# Patient Record
Sex: FEMALE | Race: WHITE | NOT HISPANIC OR LATINO | Employment: OTHER | URBAN - METROPOLITAN AREA
[De-identification: names, ages, dates, MRNs, and addresses within clinical notes are randomized per-mention and may not be internally consistent; named-entity substitution may affect disease eponyms.]

---

## 2017-07-24 ENCOUNTER — HOSPITAL ENCOUNTER (OUTPATIENT)
Facility: HOSPITAL | Age: 82
Discharge: NURSING, REHAB OR LONG-TERM CARE | End: 2017-07-26
Attending: EMERGENCY MEDICINE | Admitting: HOSPITALIST
Payer: MEDICARE

## 2017-07-24 DIAGNOSIS — I50.32 HEART FAILURE, DIASTOLIC, CHRONIC: ICD-10-CM

## 2017-07-24 DIAGNOSIS — G30.1 LATE ONSET ALZHEIMER'S DISEASE WITHOUT BEHAVIORAL DISTURBANCE: ICD-10-CM

## 2017-07-24 DIAGNOSIS — F02.80 LATE ONSET ALZHEIMER'S DISEASE WITHOUT BEHAVIORAL DISTURBANCE: ICD-10-CM

## 2017-07-24 DIAGNOSIS — R10.9 ABDOMINAL PAIN, UNSPECIFIED LOCATION: Primary | ICD-10-CM

## 2017-07-24 DIAGNOSIS — N18.30 CHRONIC KIDNEY DISEASE, STAGE III (MODERATE): ICD-10-CM

## 2017-07-24 LAB
ALBUMIN SERPL BCP-MCNC: 2.3 G/DL
ALP SERPL-CCNC: 95 U/L
ALT SERPL W/O P-5'-P-CCNC: 13 U/L
ANION GAP SERPL CALC-SCNC: 7 MMOL/L
AST SERPL-CCNC: 34 U/L
BASOPHILS # BLD AUTO: 0.04 K/UL
BASOPHILS NFR BLD: 0.8 %
BILIRUB SERPL-MCNC: 0.6 MG/DL
BUN SERPL-MCNC: 15 MG/DL
BUN SERPL-MCNC: 19 MG/DL (ref 6–30)
CALCIUM SERPL-MCNC: 8.4 MG/DL
CHLORIDE SERPL-SCNC: 112 MMOL/L
CHLORIDE SERPL-SCNC: 112 MMOL/L (ref 95–110)
CO2 SERPL-SCNC: 21 MMOL/L
CREAT SERPL-MCNC: 1 MG/DL (ref 0.5–1.4)
CREAT SERPL-MCNC: 1.1 MG/DL
DIFFERENTIAL METHOD: ABNORMAL
EOSINOPHIL # BLD AUTO: 0.1 K/UL
EOSINOPHIL NFR BLD: 1.9 %
ERYTHROCYTE [DISTWIDTH] IN BLOOD BY AUTOMATED COUNT: 13.8 %
EST. GFR  (AFRICAN AMERICAN): 52.5 ML/MIN/1.73 M^2
EST. GFR  (NON AFRICAN AMERICAN): 45.6 ML/MIN/1.73 M^2
GLUCOSE SERPL-MCNC: 101 MG/DL (ref 70–110)
GLUCOSE SERPL-MCNC: 102 MG/DL
HCT VFR BLD AUTO: 34.3 %
HCT VFR BLD CALC: 32 %PCV (ref 36–54)
HGB BLD-MCNC: 11.4 G/DL
LYMPHOCYTES # BLD AUTO: 1.2 K/UL
LYMPHOCYTES NFR BLD: 24 %
MCH RBC QN AUTO: 31.4 PG
MCHC RBC AUTO-ENTMCNC: 33.2 G/DL
MCV RBC AUTO: 95 FL
MONOCYTES # BLD AUTO: 0.6 K/UL
MONOCYTES NFR BLD: 12.3 %
NEUTROPHILS # BLD AUTO: 2.9 K/UL
NEUTROPHILS NFR BLD: 60.6 %
PLATELET # BLD AUTO: 104 K/UL
PMV BLD AUTO: 10.1 FL
POC IONIZED CALCIUM: 1.13 MMOL/L (ref 1.06–1.42)
POC TCO2 (MEASURED): 26 MMOL/L (ref 23–29)
POTASSIUM BLD-SCNC: 4.2 MMOL/L (ref 3.5–5.1)
POTASSIUM SERPL-SCNC: 4.2 MMOL/L
PROT SERPL-MCNC: 6.6 G/DL
RBC # BLD AUTO: 3.63 M/UL
SAMPLE: ABNORMAL
SODIUM BLD-SCNC: 144 MMOL/L (ref 136–145)
SODIUM SERPL-SCNC: 140 MMOL/L
WBC # BLD AUTO: 4.8 K/UL

## 2017-07-24 PROCEDURE — 80053 COMPREHEN METABOLIC PANEL: CPT

## 2017-07-24 PROCEDURE — 99285 EMERGENCY DEPT VISIT HI MDM: CPT | Mod: ,,, | Performed by: EMERGENCY MEDICINE

## 2017-07-24 PROCEDURE — 96360 HYDRATION IV INFUSION INIT: CPT | Mod: 59

## 2017-07-24 PROCEDURE — 85025 COMPLETE CBC W/AUTO DIFF WBC: CPT

## 2017-07-24 PROCEDURE — 99285 EMERGENCY DEPT VISIT HI MDM: CPT | Mod: 25

## 2017-07-25 PROBLEM — K44.9 HIATAL HERNIA: Status: ACTIVE | Noted: 2017-07-25

## 2017-07-25 PROBLEM — R10.9 ABDOMINAL PAIN: Status: ACTIVE | Noted: 2017-07-25

## 2017-07-25 LAB
BILIRUB UR QL STRIP: NEGATIVE
CLARITY UR REFRACT.AUTO: ABNORMAL
COLOR UR AUTO: YELLOW
GLUCOSE UR QL STRIP: NEGATIVE
HGB UR QL STRIP: NEGATIVE
KETONES UR QL STRIP: NEGATIVE
LEUKOCYTE ESTERASE UR QL STRIP: NEGATIVE
NITRITE UR QL STRIP: NEGATIVE
PH UR STRIP: 8 [PH] (ref 5–8)
PROT UR QL STRIP: NEGATIVE
SP GR UR STRIP: 1.01 (ref 1–1.03)
URN SPEC COLLECT METH UR: ABNORMAL
UROBILINOGEN UR STRIP-ACNC: NEGATIVE EU/DL

## 2017-07-25 PROCEDURE — 99220 PR INITIAL OBSERVATION CARE,LEVL III: CPT | Mod: ,,, | Performed by: PHYSICIAN ASSISTANT

## 2017-07-25 PROCEDURE — G8996 SWALLOW CURRENT STATUS: HCPCS | Mod: CK

## 2017-07-25 PROCEDURE — A4216 STERILE WATER/SALINE, 10 ML: HCPCS | Performed by: PHYSICIAN ASSISTANT

## 2017-07-25 PROCEDURE — G0378 HOSPITAL OBSERVATION PER HR: HCPCS

## 2017-07-25 PROCEDURE — 25000003 PHARM REV CODE 250: Performed by: PHYSICIAN ASSISTANT

## 2017-07-25 PROCEDURE — G8997 SWALLOW GOAL STATUS: HCPCS | Mod: CJ

## 2017-07-25 PROCEDURE — 25500020 PHARM REV CODE 255: Performed by: EMERGENCY MEDICINE

## 2017-07-25 PROCEDURE — 81003 URINALYSIS AUTO W/O SCOPE: CPT

## 2017-07-25 PROCEDURE — 92610 EVALUATE SWALLOWING FUNCTION: CPT

## 2017-07-25 RX ORDER — PANTOPRAZOLE SODIUM 40 MG/1
40 TABLET, DELAYED RELEASE ORAL DAILY
Status: DISCONTINUED | OUTPATIENT
Start: 2017-07-25 | End: 2017-07-26 | Stop reason: HOSPADM

## 2017-07-25 RX ORDER — DONEPEZIL HYDROCHLORIDE 10 MG/1
10 TABLET, FILM COATED ORAL DAILY
Status: DISCONTINUED | OUTPATIENT
Start: 2017-07-25 | End: 2017-07-26 | Stop reason: HOSPADM

## 2017-07-25 RX ORDER — ESCITALOPRAM OXALATE 10 MG/1
10 TABLET ORAL DAILY
Status: DISCONTINUED | OUTPATIENT
Start: 2017-07-25 | End: 2017-07-26 | Stop reason: HOSPADM

## 2017-07-25 RX ORDER — AMOXICILLIN 250 MG
1 CAPSULE ORAL 2 TIMES DAILY
Status: DISCONTINUED | OUTPATIENT
Start: 2017-07-25 | End: 2017-07-26 | Stop reason: HOSPADM

## 2017-07-25 RX ORDER — ONDANSETRON 4 MG/1
8 TABLET, ORALLY DISINTEGRATING ORAL EVERY 8 HOURS PRN
Status: DISCONTINUED | OUTPATIENT
Start: 2017-07-25 | End: 2017-07-26 | Stop reason: HOSPADM

## 2017-07-25 RX ORDER — ONDANSETRON 2 MG/ML
4 INJECTION INTRAMUSCULAR; INTRAVENOUS EVERY 8 HOURS PRN
Status: DISCONTINUED | OUTPATIENT
Start: 2017-07-25 | End: 2017-07-26 | Stop reason: HOSPADM

## 2017-07-25 RX ORDER — LISINOPRIL 5 MG/1
5 TABLET ORAL DAILY
Status: DISCONTINUED | OUTPATIENT
Start: 2017-07-25 | End: 2017-07-26 | Stop reason: HOSPADM

## 2017-07-25 RX ORDER — MEMANTINE HYDROCHLORIDE 10 MG/1
10 TABLET ORAL 2 TIMES DAILY
Status: DISCONTINUED | OUTPATIENT
Start: 2017-07-25 | End: 2017-07-26 | Stop reason: HOSPADM

## 2017-07-25 RX ORDER — ASPIRIN 81 MG/1
81 TABLET ORAL DAILY
Status: DISCONTINUED | OUTPATIENT
Start: 2017-07-25 | End: 2017-07-26 | Stop reason: HOSPADM

## 2017-07-25 RX ORDER — GUAIFENESIN 600 MG/1
1200 TABLET, EXTENDED RELEASE ORAL 2 TIMES DAILY
Status: DISCONTINUED | OUTPATIENT
Start: 2017-07-25 | End: 2017-07-26 | Stop reason: HOSPADM

## 2017-07-25 RX ORDER — RAMELTEON 8 MG/1
8 TABLET ORAL NIGHTLY PRN
Status: DISCONTINUED | OUTPATIENT
Start: 2017-07-25 | End: 2017-07-26 | Stop reason: HOSPADM

## 2017-07-25 RX ORDER — LEVALBUTEROL INHALATION SOLUTION 0.63 MG/3ML
1 SOLUTION RESPIRATORY (INHALATION) EVERY 4 HOURS PRN
Status: DISCONTINUED | OUTPATIENT
Start: 2017-07-25 | End: 2017-07-26 | Stop reason: HOSPADM

## 2017-07-25 RX ORDER — SODIUM CHLORIDE 0.9 % (FLUSH) 0.9 %
3 SYRINGE (ML) INJECTION EVERY 8 HOURS
Status: DISCONTINUED | OUTPATIENT
Start: 2017-07-25 | End: 2017-07-26 | Stop reason: HOSPADM

## 2017-07-25 RX ORDER — AMLODIPINE BESYLATE 5 MG/1
5 TABLET ORAL DAILY
Status: DISCONTINUED | OUTPATIENT
Start: 2017-07-25 | End: 2017-07-26 | Stop reason: HOSPADM

## 2017-07-25 RX ORDER — POLYETHYLENE GLYCOL 3350 17 G/17G
17 POWDER, FOR SOLUTION ORAL 2 TIMES DAILY PRN
Status: DISCONTINUED | OUTPATIENT
Start: 2017-07-25 | End: 2017-07-26 | Stop reason: HOSPADM

## 2017-07-25 RX ORDER — ACETAMINOPHEN 325 MG/1
650 TABLET ORAL EVERY 4 HOURS PRN
Status: DISCONTINUED | OUTPATIENT
Start: 2017-07-25 | End: 2017-07-26 | Stop reason: HOSPADM

## 2017-07-25 RX ORDER — ACETAMINOPHEN 325 MG/1
650 TABLET ORAL EVERY 8 HOURS PRN
Status: DISCONTINUED | OUTPATIENT
Start: 2017-07-25 | End: 2017-07-26 | Stop reason: HOSPADM

## 2017-07-25 RX ORDER — METOPROLOL SUCCINATE 50 MG/1
50 TABLET, EXTENDED RELEASE ORAL DAILY
Status: DISCONTINUED | OUTPATIENT
Start: 2017-07-25 | End: 2017-07-26 | Stop reason: HOSPADM

## 2017-07-25 RX ADMIN — ASPIRIN 81 MG: 81 TABLET, COATED ORAL at 10:07

## 2017-07-25 RX ADMIN — LISINOPRIL 5 MG: 5 TABLET ORAL at 10:07

## 2017-07-25 RX ADMIN — PANTOPRAZOLE SODIUM 40 MG: 40 TABLET, DELAYED RELEASE ORAL at 10:07

## 2017-07-25 RX ADMIN — SODIUM CHLORIDE 500 ML: 0.9 INJECTION, SOLUTION INTRAVENOUS at 12:07

## 2017-07-25 RX ADMIN — STANDARDIZED SENNA CONCENTRATE AND DOCUSATE SODIUM 1 TABLET: 8.6; 5 TABLET, FILM COATED ORAL at 08:07

## 2017-07-25 RX ADMIN — MEMANTINE HYDROCHLORIDE 10 MG: 10 TABLET ORAL at 08:07

## 2017-07-25 RX ADMIN — ESCITALOPRAM OXALATE 10 MG: 10 TABLET ORAL at 10:07

## 2017-07-25 RX ADMIN — IOHEXOL 75 ML: 350 INJECTION, SOLUTION INTRAVENOUS at 12:07

## 2017-07-25 RX ADMIN — SODIUM CHLORIDE, PRESERVATIVE FREE 3 ML: 5 INJECTION INTRAVENOUS at 01:07

## 2017-07-25 RX ADMIN — MEMANTINE HYDROCHLORIDE 10 MG: 10 TABLET ORAL at 10:07

## 2017-07-25 RX ADMIN — DONEPEZIL HYDROCHLORIDE 10 MG: 10 TABLET, FILM COATED ORAL at 10:07

## 2017-07-25 RX ADMIN — METOPROLOL SUCCINATE 50 MG: 50 TABLET, EXTENDED RELEASE ORAL at 10:07

## 2017-07-25 RX ADMIN — GUAIFENESIN 1200 MG: 600 TABLET, EXTENDED RELEASE ORAL at 08:07

## 2017-07-25 RX ADMIN — GUAIFENESIN 1200 MG: 600 TABLET, EXTENDED RELEASE ORAL at 10:07

## 2017-07-25 RX ADMIN — STANDARDIZED SENNA CONCENTRATE AND DOCUSATE SODIUM 1 TABLET: 8.6; 5 TABLET, FILM COATED ORAL at 10:07

## 2017-07-25 RX ADMIN — AMLODIPINE BESYLATE 5 MG: 5 TABLET ORAL at 10:07

## 2017-07-25 NOTE — ED NOTES
940 A assigned and dirty. Ross Roger supervisor called about status of room. She instructed me to call [facility]HELP.

## 2017-07-25 NOTE — PLAN OF CARE
Problem: SLP Goal  Goal: SLP Goal  Speech Language Pathology Goals  Goals expected to be met by 8/1  1. Pt will tolerate puree food and nectar thick liquid without s/s of airway compromise.   2. Pt will tolerate trials of dental soft food with SLP without s/s of airway compromise.   3. Pt will tolerate trials of thin liquid with SLP only without s/s of airway compromise.        Swallow evaluation completed with initiated POC.    Recommending:  Puree foods  [place] thick liquids   To achieve nectar thick liquid: 6 oz of any liquid to 1 pack of thickener  Small bites/sips  Medications crushed in puree  Assist with meals and with thickening  No straw    Formal report to follow.     Mayra Truong M.A. CCC-SLP  Speech Language Pathologist  [phone]  7/25/2017

## 2017-07-25 NOTE — HPI
This is a 86 year old female with dementia, HTN, HLD, CAD, and renal disease with known large hiatal hernia, who presents to ER with right sided lower abdominal pain. This began at about 5 pm this evening and prompted her nursing home to send her to the Er for further evaluation. Upon my exam she was feeling better, and denies abdominal pain. She had a Ct scan which revealed a relatively stable hiatal hernia, with stomach and transverse colon, with some concern about thickened gastric wall, which is likely related to incomplete gastric distension. She has not been having any significant dsyphgia or reflux, Shortness of breath or chest pains, from this hernia. And it appears stable when compared to images from 2011.

## 2017-07-25 NOTE — PLAN OF CARE
Per Robinson, pt is from [facility]. Discharge plan is for pt to return.    Shelley Sunshine LMSW  [id number]

## 2017-07-25 NOTE — ED TRIAGE NOTES
Pt comes from nursing home for abdominal pain. Daughter reports pt was having RLQ abdominal pain earlier this evening. Pt has had decreased appetite with no n/v/d. Daughter reports last BM was yesterday.

## 2017-07-25 NOTE — HPI
86 year old female with past medical history dementia, CAD, h/o CVA, HLD, HTN, SSS s/p pacemaker, GERD, chronic diastolic CHF, and chronic bronchitis. She presents to ED today with daughter at bedside with complaints of RLQ abdominal pain. Per daughter, yesterday around 5-6 pm patient grabbed her right lower side and complained of 6/10 abdominal pain in the area. The NH physician directed patient to ED and upon arrival abdominal pain resolved. No fevers, chills, or sweats. Daughter states she had a BM while in ED that was normal. No GI blood loss reported, diarrhea, or N/V. She does note patient has decreased intake at time and weight has gone down over time (140>123 lbs) but she is working with nutritionist at [facility].     While in ED, VSS and initial labs unremarkable. UA clear. CT abd/pelvis with contrast complete showing large hiatal hernia, stable from previous. General surgery consulted and felt no intervention warranted. Placed in observation overnight for further management.

## 2017-07-25 NOTE — ED NOTES
Linens changed for pt and pernial care provided. Pt and family updated on status. Pt placed in fresh diaper

## 2017-07-25 NOTE — PT/OT/SLP EVAL
"Speech Language Pathology  Evaluation    Margarita Castrejon   MRN: [id number]   Admitting Diagnosis: Abdominal pain    Diet recommendation:  Puree foods  [place] thick liquids                        To achieve nectar thick liquid: 6 oz of any liquid to 1 pack of thickener  Small bites/sips  Medications crushed in puree  Assist with meals and with thickening  No straw    SLP Treatment Date: 07/25/17  Speech Start Time: 1023     Speech Stop Time: 1036     Speech Total (min): 13 min       TREATMENT BILLABLE MINUTES:  Eval Swallow and Oral Function 13    Diagnosis: Abdominal pain      Past Medical History:   Diagnosis Date    Alzheimer disease     Coronary artery disease 6/28/2013 1993. CABG x 3. EJ.    CVA (cerebrovascular accident) 6/28/2013 2009.    Dementia     Depression     GERD (gastroesophageal reflux disease)     Heart failure, diastolic, acute on chronic 9/19/2016    820/2016: CXR: Mild CHF. Minimal effusions.    HTN (hypertension)     Hypercholesteremia 6/28/2013    Hypertension, benign 6/28/2013    Diagnosed 1990.    Osteoporosis, unspecified     Pacemaker 6/28/2013    10/22/2012: [facility]PM. Merlin not on because she is at NH.    Renal disorder     SSS (sick sinus syndrome) 6/28/2013    10/22/2012: [facility].    Supraventricular tachycardia 11/18/2014     Past Surgical History:   Procedure Laterality Date    HERNIA REPAIR      kidney surgery         Has the patient been evaluated by SLP for swallowing? : Yes  Keep patient NPO?: No   General Precautions: Standard,            Social Hx: Patient from NH.    Prior diet: Per H&P, reported dysphagia via daughter; on dental soft diet and thin liquids. No other SLP notes in Epic.        Subjective:  Pt awoke with min stimulation  Patient goals: did not state  "Please stop bothering me; I just want to sleep"    Pain/Comfort  Pain Rating 1: 0/10  Pain Rating Post-Intervention 1: 0/10    Objective:        Oral Musculature Evaluation  Oral " Musculature: general weakness  Dentition: edentulous  Mucosal Quality: dry  Mandibular Strength and Mobility: impaired  Oral Labial Strength and Mobility: WFL  Lingual Strength and Mobility: functional strength  Buccal Strength and Mobility: WFL  Volitional Swallow: able to demonstrate  Voice Prior to PO Intake: clear     Bedside Swallow Eval:  Consistencies Assessed: Thin liquids 4 cup sips, Nectar thick liquids 1 spoonful, Puree 3 fullspoonfuls and Solids 1 small bite rukhsana cracker  Oral Phase: adequate oral acceptance; slow bolus manipulation, funcation; mastication of cracker impaired, missing dentition contributing  Pharyngeal Phase: cough x2 and consistent belching with thin liquid; delayed cough x1 with puree; no s/s of airway compromise of nectar thick liquid.     Attempted continued trials of nectar thick liquid, however, pt refused by pushing cup away and asking to sleep. Congested cough during session; no recent CXR. Skilled education provided on aspiration precautions and diet recommendations. Whiteboard updated with diet recommendations/aspiration precautions. Recs victoriano Heller PA-C and RN. No further questions.                                   Assessment:  Margarita Castrejon is a 86 y.o. female with a medical diagnosis of Abdominal pain and presents with oropharyngeal dysphagia. Limited trials of nectar thick liquid; would benefit from continued trials to complete assessment.     Do you have any cultural, spiritual, Judaism conflicts, given your current situation?: no     Discharge recommendations: Discharge Facility/Level Of Care Needs:  (return to facility)     Goals:    SLP Goals        Problem: SLP Goal    Goal Priority Disciplines Outcome   SLP Goal     SLP    Description:  Speech Language Pathology Goals  Goals expected to be met by 8/1  1. Pt will tolerate puree food and nectar thick liquid without s/s of airway compromise.   2. Pt will tolerate trials of dental soft food with SLP  without s/s of airway compromise.   3. Pt will tolerate trials of thin liquid with SLP only without s/s of airway compromise.                         Plan:   Patient to be seen Therapy Frequency: 5 x/week   Plan of Care expires: 08/23/17  Plan of Care reviewed with: patient  SLP Follow-up?: Yes             Mayra Truong M.A. CCC-SLP  Speech Language Pathologist  [phone]  7/25/2017

## 2017-07-25 NOTE — SUBJECTIVE & OBJECTIVE
No current facility-administered medications on file prior to encounter.      Current Outpatient Prescriptions on File Prior to Encounter   Medication Sig    acetaminophen (TYLENOL) 325 MG tablet Take 325 mg by mouth every 6 (six) hours as needed for Pain.    alendronate (FOSAMAX) 70 MG tablet Take 1 tablet (70 mg total) by mouth every 7 days. 1 Tablet Oral Weekly    amlodipine (NORVASC) 5 MG tablet Take 1 tablet (5 mg total) by mouth once daily.    aspirin (ECOTRIN) 81 MG EC tablet Take 81 mg by mouth. 1 Tablet, Delayed Release (E.C.) Oral Every day.  available over the counter    CALCIUM CARBONATE/VITAMIN D3 (CALCIUM 600 + D,3, ORAL) Take by mouth 2 (two) times daily.    donepezil (ARICEPT) 10 MG tablet Take 1 tablet (10 mg total) by mouth once daily. 1 Tablet Oral Every day    escitalopram oxalate (LEXAPRO) 10 MG tablet Take 1 tablet (10 mg total) by mouth once daily.    furosemide (LASIX) 20 MG tablet Take 20 mg by mouth once daily.     guaifenesin (MUCINEX) 600 mg 12 hr tablet Take 1,200 mg by mouth 2 (two) times daily.    levalbuterol (XOPENEX) 0.63 mg/3 mL nebulizer solution Take 3 mLs (0.63 mg total) by nebulization 2 (two) times daily as needed for Shortness of Breath.    lisinopril (PRINIVIL,ZESTRIL) 5 MG tablet Take 1 tablet (5 mg total) by mouth once daily.    memantine (NAMENDA) 10 MG Tab Take 1 tablet (10 mg total) by mouth 2 (two) times daily.    metoprolol succinate (TOPROL-XL) 50 MG 24 hr tablet Take 1 tablet (50 mg total) by mouth once daily.    nitroGLYCERIN (NITROSTAT) 0.4 MG SL tablet 1 tab every 5 minutes as needed. maximum 3 tabs in 15 mins.    polyethylene glycol (MIRALAX) 17 gram PwPk Take by mouth.    potassium chloride SA (K-DUR,KLOR-CON) 20 MEQ tablet Take 1 tablet (20 mEq total) by mouth once daily.    trazodone (DESYREL) 50 MG tablet Take 1 tablet (50 mg total) by mouth every evening.       Review of patient's allergies indicates:   Allergen Reactions    Heparin  analogues Shortness Of Breath    Phenytoin sodium extended Rash       Past Medical History:   Diagnosis Date    Alzheimer disease     Coronary artery disease 6/28/2013 1993. CABG x 3. EJ.    CVA (cerebrovascular accident) 6/28/2013 2009.    Dementia     Depression     GERD (gastroesophageal reflux disease)     Heart failure, diastolic, acute on chronic 9/19/2016    820/2016: CXR: Mild CHF. Minimal effusions.    HTN (hypertension)     Hypercholesteremia 6/28/2013    Hypertension, benign 6/28/2013    Diagnosed 1990.    Osteoporosis, unspecified     Pacemaker 6/28/2013    10/22/2012: [facility] PPM. Merlin not on because she is at NH.    Renal disorder     SSS (sick sinus syndrome) 6/28/2013    10/22/2012: [facility].    Supraventricular tachycardia 11/18/2014     Past Surgical History:   Procedure Laterality Date    HERNIA REPAIR      kidney surgery       Family History     None        Social History Main Topics    Smoking status: Former Smoker     Packs/day: 1.00     Years: 40.00     Types: Cigarettes     Start date: 1/22/1969     Quit date: 1/1/2009    Smokeless tobacco: Never Used    Alcohol use No    Drug use: No    Sexual activity: Not Currently     Review of Systems   Constitutional: Negative for activity change, appetite change and fever.   Respiratory: Negative for cough and shortness of breath.    Gastrointestinal: Positive for abdominal pain. Negative for constipation, diarrhea, nausea and vomiting.   Genitourinary: Negative for dysuria.   Musculoskeletal: Negative for back pain.     Objective:     Vital Signs (Most Recent):  Temp: 98.2 °F (36.8 °C) (07/24/17 2038)  Pulse: 60 (07/25/17 0211)  Resp: 20 (07/25/17 0211)  BP: 130/69 (07/25/17 0203)  SpO2: (!) 94 % (07/25/17 0211) Vital Signs (24h Range):  Temp:  [98.2 °F (36.8 °C)] 98.2 °F (36.8 °C)  Pulse:  [60-85] 60  Resp:  [18-21] 20  SpO2:  [93 %-96 %] 94 %  BP: (113-143)/(57-73) 130/69     Weight: 54.4 kg (120 lb)  Body  mass index is 20.6 kg/m².    Physical Exam   Constitutional: She is oriented to person, place, and time. She appears well-developed and well-nourished. No distress.   HENT:   Head: Normocephalic and atraumatic.   Eyes: Conjunctivae are normal. Right eye exhibits no discharge. Left eye exhibits no discharge. No scleral icterus.   Neck: Normal range of motion. Neck supple. No JVD present. No tracheal deviation present.   Cardiovascular: Normal rate and regular rhythm.    Pulmonary/Chest: Effort normal. No respiratory distress.   Abdominal: Soft. She exhibits no distension. There is tenderness (pelvic area).   Neurological: She is alert and oriented to person, place, and time.   Skin: Skin is warm and dry. No rash noted. She is not diaphoretic. No erythema.       Significant Labs:  CBC:   Recent Labs  Lab 07/24/17  2323 07/24/17  2329   WBC 4.80  --    RBC 3.63*  --    HGB 11.4*  --    HCT 34.3* 32*   *  --    MCV 95  --    MCH 31.4*  --    MCHC 33.2  --      BMP:   Recent Labs  Lab 07/24/17  2323         K 4.2   *   CO2 21*   BUN 15   CREATININE 1.1   CALCIUM 8.4*       Significant Diagnostics:  CT: I have reviewed all pertinent results/findings within the past 24 hours and my personal findings are:  as above

## 2017-07-25 NOTE — CONSULTS
[facility]  General Surgery  Consult Note    Patient Name: Margarita Castrejon  MRN: [id number]  Code Status: Prior  Admission Date: 7/24/2017  Hospital Length of Stay: 0 days  Attending Physician: Ke Agudelo MD  Primary Care Provider: Patrick Landa MD    Patient information was obtained from patient, relative(s) and ER records.     Inpatient consult to General surgery  Consult performed by: HA PERRY  Consult ordered by: MARY FOREMAN        Subjective:     Principal Problem: <principal problem not specified>    History of Present Illness: This is a 86 year old female with dementia, HTN, HLD, CAD, and renal disease with known large hiatal hernia, who presents to ER with right sided lower abdominal pain. This began at about 5 pm this evening and prompted her nursing home to send her to the Er for further evaluation. Upon my exam she was feeling better, and denies abdominal pain. She had a Ct scan which revealed a relatively stable hiatal hernia, with stomach and transverse colon, with some concern about thickened gastric wall, which is likely related to incomplete gastric distension. She has not been having any significant dsyphgia or reflux, Shortness of breath or chest pains, from this hernia. And it appears stable when compared to images from 2011.      No current facility-administered medications on file prior to encounter.      Current Outpatient Prescriptions on File Prior to Encounter   Medication Sig    acetaminophen (TYLENOL) 325 MG tablet Take 325 mg by mouth every 6 (six) hours as needed for Pain.    alendronate (FOSAMAX) 70 MG tablet Take 1 tablet (70 mg total) by mouth every 7 days. 1 Tablet Oral Weekly    amlodipine (NORVASC) 5 MG tablet Take 1 tablet (5 mg total) by mouth once daily.    aspirin (ECOTRIN) 81 MG EC tablet Take 81 mg by mouth. 1 Tablet, Delayed Release (E.C.) Oral Every day.  available over the counter    CALCIUM CARBONATE/VITAMIN D3 (CALCIUM 600  + D,3, ORAL) Take by mouth 2 (two) times daily.    donepezil (ARICEPT) 10 MG tablet Take 1 tablet (10 mg total) by mouth once daily. 1 Tablet Oral Every day    escitalopram oxalate (LEXAPRO) 10 MG tablet Take 1 tablet (10 mg total) by mouth once daily.    furosemide (LASIX) 20 MG tablet Take 20 mg by mouth once daily.     guaifenesin (MUCINEX) 600 mg 12 hr tablet Take 1,200 mg by mouth 2 (two) times daily.    levalbuterol (XOPENEX) 0.63 mg/3 mL nebulizer solution Take 3 mLs (0.63 mg total) by nebulization 2 (two) times daily as needed for Shortness of Breath.    lisinopril (PRINIVIL,ZESTRIL) 5 MG tablet Take 1 tablet (5 mg total) by mouth once daily.    memantine (NAMENDA) 10 MG Tab Take 1 tablet (10 mg total) by mouth 2 (two) times daily.    metoprolol succinate (TOPROL-XL) 50 MG 24 hr tablet Take 1 tablet (50 mg total) by mouth once daily.    nitroGLYCERIN (NITROSTAT) 0.4 MG SL tablet 1 tab every 5 minutes as needed. maximum 3 tabs in 15 mins.    polyethylene glycol (MIRALAX) 17 gram PwPk Take by mouth.    potassium chloride SA (K-DUR,KLOR-CON) 20 MEQ tablet Take 1 tablet (20 mEq total) by mouth once daily.    trazodone (DESYREL) 50 MG tablet Take 1 tablet (50 mg total) by mouth every evening.       Review of patient's allergies indicates:   Allergen Reactions    Heparin analogues Shortness Of Breath    Phenytoin sodium extended Rash       Past Medical History:   Diagnosis Date    Alzheimer disease     Coronary artery disease 6/28/2013 1993. CABG x 3. EJ.    CVA (cerebrovascular accident) 6/28/2013 2009.    Dementia     Depression     GERD (gastroesophageal reflux disease)     Heart failure, diastolic, acute on chronic 9/19/2016    820/2016: CXR: Mild CHF. Minimal effusions.    HTN (hypertension)     Hypercholesteremia 6/28/2013    Hypertension, benign 6/28/2013    Diagnosed 1990.    Osteoporosis, unspecified     Pacemaker 6/28/2013    10/22/2012: [facility] PPM. Merlin not on  because she is at NH.    Renal disorder     SSS (sick sinus syndrome) 6/28/2013    10/22/2012: [facility] PPM.    Supraventricular tachycardia 11/18/2014     Past Surgical History:   Procedure Laterality Date    HERNIA REPAIR      kidney surgery       Family History     None        Social History Main Topics    Smoking status: Former Smoker     Packs/day: 1.00     Years: 40.00     Types: Cigarettes     Start date: 1/22/1969     Quit date: 1/1/2009    Smokeless tobacco: Never Used    Alcohol use No    Drug use: No    Sexual activity: Not Currently     Review of Systems   Constitutional: Negative for activity change, appetite change and fever.   Respiratory: Negative for cough and shortness of breath.    Gastrointestinal: Positive for abdominal pain. Negative for constipation, diarrhea, nausea and vomiting.   Genitourinary: Negative for dysuria.   Musculoskeletal: Negative for back pain.     Objective:     Vital Signs (Most Recent):  Temp: 98.2 °F (36.8 °C) (07/24/17 2038)  Pulse: 60 (07/25/17 0211)  Resp: 20 (07/25/17 0211)  BP: 130/69 (07/25/17 0203)  SpO2: (!) 94 % (07/25/17 0211) Vital Signs (24h Range):  Temp:  [98.2 °F (36.8 °C)] 98.2 °F (36.8 °C)  Pulse:  [60-85] 60  Resp:  [18-21] 20  SpO2:  [93 %-96 %] 94 %  BP: (113-143)/(57-73) 130/69     Weight: 54.4 kg (120 lb)  Body mass index is 20.6 kg/m².    Physical Exam   Constitutional: She is oriented to person, place, and time. She appears well-developed and well-nourished. No distress.   HENT:   Head: Normocephalic and atraumatic.   Eyes: Conjunctivae are normal. Right eye exhibits no discharge. Left eye exhibits no discharge. No scleral icterus.   Neck: Normal range of motion. Neck supple. No JVD present. No tracheal deviation present.   Cardiovascular: Normal rate and regular rhythm.    Pulmonary/Chest: Effort normal. No respiratory distress.   Abdominal: Soft. She exhibits no distension. There is tenderness (pelvic area).   Neurological: She is  alert and oriented to person, place, and time.   Skin: Skin is warm and dry. No rash noted. She is not diaphoretic. No erythema.       Significant Labs:  CBC:   Recent Labs  Lab 07/24/17 2323 07/24/17 2329   WBC 4.80  --    RBC 3.63*  --    HGB 11.4*  --    HCT 34.3* 32*   *  --    MCV 95  --    MCH 31.4*  --    MCHC 33.2  --      BMP:   Recent Labs  Lab 07/24/17 2323         K 4.2   *   CO2 21*   BUN 15   CREATININE 1.1   CALCIUM 8.4*       Significant Diagnostics:  CT: I have reviewed all pertinent results/findings within the past 24 hours and my personal findings are:  as above    Assessment/Plan:     Hiatal hernia    Patient with chronic hiatal hernia for at least the last 6 years  Stable in size over this time course  No significant symptoms of hiatal hernia  Multiple medical co morbidities would make repair of this hernia in an asymptomatic patient a very high risk operation  Repair is not warranted at this time  Her abdominal pain was located more in the pelvis than in the epigastrium or chest  She is tolerating her diet and having normal bowel function  CT scan with no other significant finding to explain this pelvic pain  Her pain is improved at this point, and only occurring with deep palpation  No surgical cause of pain identified at this time  No need for urgent surgical intervention  Would continue to monitor for worsening of pain, dysphagia or reflux symptoms            VTE Risk Mitigation     None          Thank you for your consult. I will sign off. Please contact us if you have any additional questions.    Berto Jaramillo MD  General Surgery  [facility]

## 2017-07-25 NOTE — ED NOTES
Pt resting in bed. She is AAO, resps even and unlabored. Daughter at bedside. Will continue to monitor.

## 2017-07-25 NOTE — H&P
[facility] Medicine  History & Physical    Patient Name: Margarita Castrejon  MRN: [id number]  Admission Date: 7/24/2017  Attending Physician: Vinnie Rmoano MD   Primary Care Provider: Patrick Landa MD    [facility] Medicine Team: [facility] MED E Maria Victoria Heller PA-C     Patient information was obtained from patient, relative(s), past medical records and ER records.     Subjective:     Principal Problem:Abdominal pain    Chief Complaint:   Chief Complaint   Patient presents with    Abdominal Pain     since 5pm pt from [facility]         HPI: 86 year old female with past medical history dementia, CAD, h/o CVA, HLD, HTN, SSS s/p pacemaker, GERD, chronic diastolic CHF, and chronic bronchitis. She presents to ED today with daughter at bedside with complaints of RLQ abdominal pain. Per daughter, yesterday around 5-6 pm patient grabbed her right lower side and complained of 6/10 abdominal pain in the area. The NH physician directed patient to ED and upon arrival abdominal pain resolved. No fevers, chills, or sweats. Daughter states she had a BM while in ED that was normal. No GI blood loss reported, diarrhea, or N/V. She does note patient has decreased intake at time and weight has gone down over time (140>123 lbs) but she is working with nutritionist at [facility].     While in ED, VSS and initial labs unremarkable. UA clear. CT abd/pelvis with contrast complete showing large hiatal hernia, stable from previous. General surgery consulted and felt no intervention warranted. Placed in observation overnight for further management.       86-year-old female presents to ER with her daughter for evaluation of abdominal pain.  Abdominal pain in the right side began today.  Patient had a bowel movement yesterday.  She denies any fever chills nausea or vomiting.  She denies any urinary symptoms.  Patient does suffer with dementia but is oriented and alert today.  She does not appear  to be in any acute distress. Otherwise at baseline prior. No CP/SOB/cough       Past Medical History:   Diagnosis Date    Alzheimer disease     Coronary artery disease 6/28/2013 1993. CABG x 3. EJ.    CVA (cerebrovascular accident) 6/28/2013 2009.    Dementia     Depression     GERD (gastroesophageal reflux disease)     Heart failure, diastolic, acute on chronic 9/19/2016    820/2016: CXR: Mild CHF. Minimal effusions.    HTN (hypertension)     Hypercholesteremia 6/28/2013    Hypertension, benign 6/28/2013    Diagnosed 1990.    Osteoporosis, unspecified     Pacemaker 6/28/2013    10/22/2012: [facility] PPM. Merlin not on because she is at NH.    Renal disorder     SSS (sick sinus syndrome) 6/28/2013    10/22/2012: [facility] PPM.    Supraventricular tachycardia 11/18/2014       Past Surgical History:   Procedure Laterality Date    HERNIA REPAIR      kidney surgery         Review of patient's allergies indicates:   Allergen Reactions    Heparin analogues Shortness Of Breath    Phenytoin sodium extended Rash       No current facility-administered medications on file prior to encounter.      Current Outpatient Prescriptions on File Prior to Encounter   Medication Sig    alendronate (FOSAMAX) 70 MG tablet Take 1 tablet (70 mg total) by mouth every 7 days. 1 Tablet Oral Weekly    amlodipine (NORVASC) 5 MG tablet Take 1 tablet (5 mg total) by mouth once daily.    aspirin (ECOTRIN) 81 MG EC tablet Take 81 mg by mouth. 1 Tablet, Delayed Release (E.C.) Oral Every day.  available over the counter    CALCIUM CARBONATE/VITAMIN D3 (CALCIUM 600 + D,3, ORAL) Take by mouth 2 (two) times daily.    donepezil (ARICEPT) 10 MG tablet Take 1 tablet (10 mg total) by mouth once daily. 1 Tablet Oral Every day    escitalopram oxalate (LEXAPRO) 10 MG tablet Take 1 tablet (10 mg total) by mouth once daily.    furosemide (LASIX) 20 MG tablet Take 20 mg by mouth once daily.     guaifenesin (MUCINEX) 600 mg 12  hr tablet Take 1,200 mg by mouth 2 (two) times daily.    levalbuterol (XOPENEX) 0.63 mg/3 mL nebulizer solution Take 3 mLs (0.63 mg total) by nebulization 2 (two) times daily as needed for Shortness of Breath.    lisinopril (PRINIVIL,ZESTRIL) 5 MG tablet Take 1 tablet (5 mg total) by mouth once daily.    memantine (NAMENDA) 10 MG Tab Take 1 tablet (10 mg total) by mouth 2 (two) times daily.    metoprolol succinate (TOPROL-XL) 50 MG 24 hr tablet Take 1 tablet (50 mg total) by mouth once daily.    potassium chloride SA (K-DUR,KLOR-CON) 20 MEQ tablet Take 1 tablet (20 mEq total) by mouth once daily.    acetaminophen (TYLENOL) 325 MG tablet Take 325 mg by mouth every 6 (six) hours as needed for Pain.    nitroGLYCERIN (NITROSTAT) 0.4 MG SL tablet 1 tab every 5 minutes as needed. maximum 3 tabs in 15 mins.    polyethylene glycol (MIRALAX) 17 gram PwPk Take by mouth.    trazodone (DESYREL) 50 MG tablet Take 1 tablet (50 mg total) by mouth every evening.     Family History     None        Social History Main Topics    Smoking status: Former Smoker     Packs/day: 1.00     Years: 40.00     Types: Cigarettes     Start date: 1/22/1969     Quit date: 1/1/2009    Smokeless tobacco: Never Used    Alcohol use No    Drug use: No    Sexual activity: Not Currently     Review of Systems   Constitutional: Positive for appetite change, fatigue and unexpected weight change (weight loss). Negative for activity change, chills and fever.   HENT: Negative for congestion, rhinorrhea, sinus pressure and sore throat.    Eyes: Negative for pain, redness and visual disturbance.   Respiratory: Positive for cough and shortness of breath. Negative for chest tightness, wheezing and stridor.    Cardiovascular: Negative for chest pain, palpitations and leg swelling.   Gastrointestinal: Positive for abdominal pain. Negative for abdominal distention, blood in stool, constipation, diarrhea, nausea and vomiting.   Endocrine: Negative for cold  intolerance and heat intolerance.   Genitourinary: Negative for dysuria, frequency, hematuria and urgency.   Musculoskeletal: Positive for back pain. Negative for arthralgias, myalgias and neck pain.   Skin: Negative for color change, pallor and rash.   Neurological: Positive for weakness. Negative for dizziness, tremors, syncope, numbness and headaches.   Hematological: Does not bruise/bleed easily.   Psychiatric/Behavioral: Negative for agitation, confusion and hallucinations. The patient is not nervous/anxious.      Objective:     Vital Signs (Most Recent):  Temp: 98.2 °F (36.8 °C) (07/24/17 2038)  Pulse: 61 (07/25/17 1041)  Resp: 16 (07/25/17 1041)  BP: (!) 141/67 (07/25/17 1041)  SpO2: (!) 90 % (07/25/17 1041) Vital Signs (24h Range):  Temp:  [98.2 °F (36.8 °C)] 98.2 °F (36.8 °C)  Pulse:  [60-85] 61  Resp:  [16-21] 16  SpO2:  [90 %-96 %] 90 %  BP: (113-143)/(57-73) 141/67     Weight: 54.4 kg (120 lb)  Body mass index is 20.6 kg/m².    Physical Exam   Constitutional: She appears well-developed. No distress.   Elderly, frail female   HENT:   Head: Normocephalic and atraumatic.   Mouth/Throat: Oropharynx is clear and moist.   Eyes: Conjunctivae and EOM are normal. Pupils are equal, round, and reactive to light. No scleral icterus.   Neck: Normal range of motion. Neck supple. No JVD present. No tracheal deviation present. No thyromegaly present.   Cardiovascular: Normal rate, regular rhythm and intact distal pulses.  Exam reveals no gallop and no friction rub.    No murmur heard.  Pulmonary/Chest: Effort normal. She has no wheezes. She has no rales.   Scattered rhonchi   Abdominal: Soft. Bowel sounds are normal. She exhibits no distension and no mass. There is tenderness (TTP RLQ to deep palpation. No rebound or gaurding). There is no rebound and no guarding.   Musculoskeletal: Normal range of motion. She exhibits no edema.   Neurological: She is alert. She displays normal reflexes. No cranial nerve deficit.    Skin: Skin is warm and dry. No rash noted. No erythema.   Psychiatric: She has a normal mood and affect. Her behavior is normal.      Significant Labs:   CBC:   Recent Labs  Lab 07/24/17 2323 07/24/17 2329   WBC 4.80  --    HGB 11.4*  --    HCT 34.3* 32*   *  --      CMP:   Recent Labs  Lab 07/24/17 2323      K 4.2   *   CO2 21*      BUN 15   CREATININE 1.1   CALCIUM 8.4*   PROT 6.6   ALBUMIN 2.3*   BILITOT 0.6   ALKPHOS 95   AST 34   ALT 13   ANIONGAP 7*   EGFRNONAA 45.6*     Urine Studies:   Recent Labs  Lab 07/25/17  0059   COLORU Yellow   APPEARANCEUA Hazy*   PHUR 8.0   SPECGRAV 1.015   PROTEINUA Negative   GLUCUA Negative   KETONESU Negative   BILIRUBINUA Negative   OCCULTUA Negative   NITRITE Negative   UROBILINOGEN Negative   LEUKOCYTESUR Negative     All pertinent labs within the past 24 hours have been reviewed.    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 Marked gastric wall thickening which may be secondary to infectious, inflammatory or ischemic gastritis.  The esophagus demonstrates air-fluid level.  This is worse when compared to prior examination from 2011 and may represent incarcerated herniated structures. Surgical consultation recommended. Upper endoscopy maybe helpful to further differentiate.    Assessment/Plan:     Hiatal hernia    - see plan above        Heart failure, diastolic, chronic    - clinically euvolemic/slightly dry  - Hold lasix overnight  - given IVFs in ED  - Monitor I/Os        Chronic kidney disease, stage III (moderate)    - SCr 1.1, near baseline        Alzheimer's dementia    - continue home namenda and aricept  - Delirium precautions        Coronary artery disease    - no chest pain on exam  - Continue ASA and  statin        Hypertension    - BP well controlled  - Resume home norvasc, lisinopril, and metoprolol        * Abdominal pain    - RLQ abdominal pain, currently resolved  - CT abd/pelvis with stable appearing large hiatal hernia otherwise no acute findings  - General surgery consulted in ED and no surgical cause for pain identified. Repair of hernia not warranted  - Advance to dental soft diet and consult SLP given reported dysphagia by daughter  - Monitor overnight for worsening pain or reflux  - aspiration precautions          VTE Risk Mitigation         Ordered     Medium Risk of VTE  Once      07/25/17 0955     Place LIAM hose  Until discontinued      07/25/17 0955     Place sequential compression device  Until discontinued      07/25/17 0955     Place LIAM hose  Until discontinued      07/25/17 0955        Maria Victoria Heller PA-C  Department of Hospital Medicine   [facility]

## 2017-07-25 NOTE — ED NOTES
Return call received from [facility]. She reported the house keeper assigned to clean [address] went to the wrong room, but is on her way to clean [address] now.

## 2017-07-25 NOTE — ED PROVIDER NOTES
Encounter Date: 7/24/2017    SCRIBE #1 NOTE: I, Reyna Sanchez, am scribing for, and in the presence of,  Dr. Agudelo. I have scribed the following portions of the note - the APC attestation.       History     Chief Complaint   Patient presents with    Abdominal Pain     since 5pm pt from [facility]      86-year-old female presents to ER with her daughter for evaluation of abdominal pain.  Abdominal pain in the right side began today.  Patient had a bowel movement yesterday.  She denies any fever chills nausea or vomiting.  She denies any urinary symptoms.  Patient does suffer with dementia but is oriented and alert today.  She does not appear to be in any acute distress. Otherwise at baseline prior. No CP/SOB/cough          Review of patient's allergies indicates:   Allergen Reactions    Heparin analogues Shortness Of Breath    Phenytoin sodium extended Rash     Past Medical History:   Diagnosis Date    Alzheimer disease     Coronary artery disease 6/28/2013 1993. CABG x 3. EJ.    CVA (cerebrovascular accident) 6/28/2013 2009.    Dementia     Depression     GERD (gastroesophageal reflux disease)     Heart failure, diastolic, acute on chronic 9/19/2016    820/2016: CXR: Mild CHF. Minimal effusions.    HTN (hypertension)     Hypercholesteremia 6/28/2013    Hypertension, benign 6/28/2013    Diagnosed 1990.    Osteoporosis, unspecified     Pacemaker 6/28/2013    10/22/2012: [facility] PPM. Merlin not on because she is at NH.    Renal disorder     SSS (sick sinus syndrome) 6/28/2013    10/22/2012: [facility] PPM.    Supraventricular tachycardia 11/18/2014     Past Surgical History:   Procedure Laterality Date    HERNIA REPAIR      kidney surgery       History reviewed. No pertinent family history.  Social History   Substance Use Topics    Smoking status: Former Smoker     Packs/day: 1.00     Years: 40.00     Types: Cigarettes     Start date: 1/22/1969     Quit date: 1/1/2009     Smokeless tobacco: Never Used    Alcohol use No     Review of Systems   Constitutional: Negative for fever.   HENT: Negative for sore throat.    Respiratory: Negative for shortness of breath.    Cardiovascular: Negative for chest pain.   Gastrointestinal: Positive for abdominal pain. Negative for nausea and vomiting.   Genitourinary: Negative for dysuria.   Musculoskeletal: Negative for back pain.   Skin: Negative for rash.   Neurological: Negative for weakness.   Hematological: Does not bruise/bleed easily.       Physical Exam     Initial Vitals   BP Pulse Resp Temp SpO2   07/24/17 2038 07/24/17 2038 07/24/17 2038 07/24/17 2038 07/24/17 2307   116/70 85 18 98.2 °F (36.8 °C) 96 %      MAP       07/24/17 2038       85.33         Physical Exam    Constitutional: Vital signs are normal. She appears well-developed and well-nourished. She is not diaphoretic. No distress.   HENT:   Head: Normocephalic and atraumatic.   Right Ear: External ear normal.   Left Ear: External ear normal.   Eyes: Conjunctivae and EOM are normal. Right eye exhibits no discharge. Left eye exhibits no discharge.   Cardiovascular: Normal rate, regular rhythm and normal heart sounds. Exam reveals no gallop and no friction rub.    No murmur heard.  Pulmonary/Chest: No respiratory distress. She has no wheezes. She has no rhonchi. She has no rales. She exhibits no tenderness.   Abdominal: Soft. Normal appearance, normal aorta and bowel sounds are normal. She exhibits no distension and no mass. There is tenderness. There is no rebound and no guarding.   Right upper and lower quadrant pain on exam  Mild suprapubic pain on exam  No rashes or lesions  Good bowel sounds throughout   Musculoskeletal: Normal range of motion.   Neurological: She is alert and oriented to person, place, and time.   Skin: Skin is warm and intact.   Psychiatric: She has a normal mood and affect. Her speech is normal and behavior is normal. Cognition and memory are normal.          ED Course   Procedures  Labs Reviewed   CBC W/ AUTO DIFFERENTIAL - Abnormal; Notable for the following:        Result Value    RBC 3.63 (*)     Hemoglobin 11.4 (*)     Hematocrit 34.3 (*)     MCH 31.4 (*)     Platelets 104 (*)     All other components within normal limits   COMPREHENSIVE METABOLIC PANEL - Abnormal; Notable for the following:     Chloride 112 (*)     CO2 21 (*)     Calcium 8.4 (*)     Albumin 2.3 (*)     Anion Gap 7 (*)     eGFR if  52.5 (*)     eGFR if non  45.6 (*)     All other components within normal limits   URINALYSIS, REFLEX TO URINE CULTURE - Abnormal; Notable for the following:     Appearance, UA Hazy (*)     All other components within normal limits   ISTAT PROCEDURE - Abnormal; Notable for the following:     POC Chloride 112 (*)     POC Hematocrit 32 (*)     All other components within normal limits   ISTAT CHEM8             Medical Decision Making:   History:   Old Medical Records: I decided to obtain old medical records.  Clinical Tests:   Lab Tests: Reviewed and Ordered  Radiological Study: Reviewed and Ordered  ED Management:  87 yo F with R sided abdominal pain, ROS otherwise (-).   Given risk factors, labs checked with no elevated WBC or other acute process.  CT done and concerning for severe hiatal hernia with concern for incarcerated bowel.  Pt has abnormal CT scan, I will consult general surgery for further evaluation.    Gen. surgery has seen the patient and signed off, they believe hiatal hernia is chronic and does not need emergent surgery.  Unclear etiology of R sided pain, though pt asymptomatic since ED arrival she has some mild tenderness on re-assess. Given the abnormal findings on CT in that the patient is  on exam I will admit to internal medicine serial abdominal exams and observation. Pt stable.   Other:   I have discussed this case with another health care provider.            Scribe Attestation:   Scribe #1: I  performed the above scribed service and the documentation accurately describes the services I performed. I attest to the accuracy of the note.    Attending Attestation:     Physician Attestation Statement for NP/PA:   I have conducted a face to face encounter with this patient in addition to the NP/PA, due to Medical Complexity    Other NP/PA Attestation Additions:      Medical Decision Making:   Pt with multiple co-morbidities transferred from nursing home after complaining  of right sided abdominal pain. Labs with no acute findings and no sign of UTI. CT shows significant hiatal hernia and possible incarceration of transverse colon. Surgery consulted and believe findings are chronic. Unclear cause of abdominal pain though pt has been asymptomatic in ER she still has some tenderness. Given CT findings and risk factors, will admit to medicine for observation and possible GI evaluation.        Physician Attestation for Scribe:  Physician Attestation Statement for Scribe #1: I, Dr. Agudelo, reviewed documentation, as scribed by Reyna Sanchez in my presence, and it is both accurate and complete.                 ED Course     Clinical Impression:   The encounter diagnosis was Abdominal pain, unspecified location.                           [place] PA-C  07/25/17 0446       Ke Agudelo MD  07/25/17 7158

## 2017-07-25 NOTE — SUBJECTIVE & OBJECTIVE
Past Medical History:   Diagnosis Date    Alzheimer disease     Coronary artery disease 6/28/2013 1993. CABG x 3. EJ.    CVA (cerebrovascular accident) 6/28/2013 2009.    Dementia     Depression     GERD (gastroesophageal reflux disease)     Heart failure, diastolic, acute on chronic 9/19/2016    820/2016: CXR: Mild CHF. Minimal effusions.    HTN (hypertension)     Hypercholesteremia 6/28/2013    Hypertension, benign 6/28/2013    Diagnosed 1990.    Osteoporosis, unspecified     Pacemaker 6/28/2013    10/22/2012: [facility] DDDR PPM. Merlin not on because she is at NH.    Renal disorder     SSS (sick sinus syndrome) 6/28/2013    10/22/2012: [facility] DD[facility] PPM.    Supraventricular tachycardia 11/18/2014       Past Surgical History:   Procedure Laterality Date    HERNIA REPAIR      kidney surgery         Review of patient's allergies indicates:   Allergen Reactions    Heparin analogues Shortness Of Breath    Phenytoin sodium extended Rash       No current facility-administered medications on file prior to encounter.      Current Outpatient Prescriptions on File Prior to Encounter   Medication Sig    alendronate (FOSAMAX) 70 MG tablet Take 1 tablet (70 mg total) by mouth every 7 days. 1 Tablet Oral Weekly    amlodipine (NORVASC) 5 MG tablet Take 1 tablet (5 mg total) by mouth once daily.    aspirin (ECOTRIN) 81 MG EC tablet Take 81 mg by mouth. 1 Tablet, Delayed Release (E.C.) Oral Every day.  available over the counter    CALCIUM CARBONATE/VITAMIN D3 (CALCIUM 600 + D,3, ORAL) Take by mouth 2 (two) times daily.    donepezil (ARICEPT) 10 MG tablet Take 1 tablet (10 mg total) by mouth once daily. 1 Tablet Oral Every day    escitalopram oxalate (LEXAPRO) 10 MG tablet Take 1 tablet (10 mg total) by mouth once daily.    furosemide (LASIX) 20 MG tablet Take 20 mg by mouth once daily.     guaifenesin (MUCINEX) 600 mg 12 hr tablet Take 1,200 mg by mouth 2 (two) times daily.    levalbuterol (XOPENEX)  0.63 mg/3 mL nebulizer solution Take 3 mLs (0.63 mg total) by nebulization 2 (two) times daily as needed for Shortness of Breath.    lisinopril (PRINIVIL,ZESTRIL) 5 MG tablet Take 1 tablet (5 mg total) by mouth once daily.    memantine (NAMENDA) 10 MG Tab Take 1 tablet (10 mg total) by mouth 2 (two) times daily.    metoprolol succinate (TOPROL-XL) 50 MG 24 hr tablet Take 1 tablet (50 mg total) by mouth once daily.    potassium chloride SA (K-DUR,KLOR-CON) 20 MEQ tablet Take 1 tablet (20 mEq total) by mouth once daily.    acetaminophen (TYLENOL) 325 MG tablet Take 325 mg by mouth every 6 (six) hours as needed for Pain.    nitroGLYCERIN (NITROSTAT) 0.4 MG SL tablet 1 tab every 5 minutes as needed. maximum 3 tabs in 15 mins.    polyethylene glycol (MIRALAX) 17 gram PwPk Take by mouth.    trazodone (DESYREL) 50 MG tablet Take 1 tablet (50 mg total) by mouth every evening.     Family History     None        Social History Main Topics    Smoking status: Former Smoker     Packs/day: 1.00     Years: 40.00     Types: Cigarettes     Start date: 1/22/1969     Quit date: 1/1/2009    Smokeless tobacco: Never Used    Alcohol use No    Drug use: No    Sexual activity: Not Currently     Review of Systems   Constitutional: Positive for appetite change, fatigue and unexpected weight change (weight loss). Negative for activity change, chills and fever.   HENT: Negative for congestion, rhinorrhea, sinus pressure and sore throat.    Eyes: Negative for pain, redness and visual disturbance.   Respiratory: Positive for cough and shortness of breath. Negative for chest tightness, wheezing and stridor.    Cardiovascular: Negative for chest pain, palpitations and leg swelling.   Gastrointestinal: Positive for abdominal pain. Negative for abdominal distention, blood in stool, constipation, diarrhea, nausea and vomiting.   Endocrine: Negative for cold intolerance and heat intolerance.   Genitourinary: Negative for dysuria,  frequency, hematuria and urgency.   Musculoskeletal: Positive for back pain. Negative for arthralgias, myalgias and neck pain.   Skin: Negative for color change, pallor and rash.   Neurological: Positive for weakness. Negative for dizziness, tremors, syncope, numbness and headaches.   Hematological: Does not bruise/bleed easily.   Psychiatric/Behavioral: Negative for agitation, confusion and hallucinations. The patient is not nervous/anxious.      Objective:     Vital Signs (Most Recent):  Temp: 98.2 °F (36.8 °C) (07/24/17 2038)  Pulse: 61 (07/25/17 1041)  Resp: 16 (07/25/17 1041)  BP: (!) 141/67 (07/25/17 1041)  SpO2: (!) 90 % (07/25/17 1041) Vital Signs (24h Range):  Temp:  [98.2 °F (36.8 °C)] 98.2 °F (36.8 °C)  Pulse:  [60-85] 61  Resp:  [16-21] 16  SpO2:  [90 %-96 %] 90 %  BP: (113-143)/(57-73) 141/67     Weight: 54.4 kg (120 lb)  Body mass index is 20.6 kg/m².    Physical Exam   Constitutional: She appears well-developed. No distress.   Elderly, frail female   HENT:   Head: Normocephalic and atraumatic.   Mouth/Throat: Oropharynx is clear and moist.   Eyes: Conjunctivae and EOM are normal. Pupils are equal, round, and reactive to light. No scleral icterus.   Neck: Normal range of motion. Neck supple. No JVD present. No tracheal deviation present. No thyromegaly present.   Cardiovascular: Normal rate, regular rhythm and intact distal pulses.  Exam reveals no gallop and no friction rub.    No murmur heard.  Pulmonary/Chest: Effort normal. She has no wheezes. She has no rales.   Scattered rhonchi   Abdominal: Soft. Bowel sounds are normal. She exhibits no distension and no mass. There is tenderness (TTP RLQ to deep palpation. No rebound or gaurding). There is no rebound and no guarding.   Musculoskeletal: Normal range of motion. She exhibits no edema.   Neurological: She is alert. She displays normal reflexes. No cranial nerve deficit.   Skin: Skin is warm and dry. No rash noted. No erythema.   Psychiatric: She  has a normal mood and affect. Her behavior is normal.      Significant Labs:   CBC:   Recent Labs  Lab 07/24/17  2323 07/24/17  2329   WBC 4.80  --    HGB 11.4*  --    HCT 34.3* 32*   *  --      CMP:   Recent Labs  Lab 07/24/17 2323      K 4.2   *   CO2 21*      BUN 15   CREATININE 1.1   CALCIUM 8.4*   PROT 6.6   ALBUMIN 2.3*   BILITOT 0.6   ALKPHOS 95   AST 34   ALT 13   ANIONGAP 7*   EGFRNONAA 45.6*     Urine Studies:   Recent Labs  Lab 07/25/17  0059   COLORU Yellow   APPEARANCEUA Hazy*   PHUR 8.0   SPECGRAV 1.015   PROTEINUA Negative   GLUCUA Negative   KETONESU Negative   BILIRUBINUA Negative   OCCULTUA Negative   NITRITE Negative   UROBILINOGEN Negative   LEUKOCYTESUR Negative     All pertinent labs within the past 24 hours have been reviewed.    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 Marked gastric wall thickening which may be secondary to infectious, inflammatory or ischemic gastritis.  The esophagus demonstrates air-fluid level.  This is worse when compared to prior examination from 2011 and may represent incarcerated herniated structures. Surgical consultation recommended. Upper endoscopy maybe helpful to further differentiate.

## 2017-07-26 VITALS
OXYGEN SATURATION: 95 % | TEMPERATURE: 97 F | DIASTOLIC BLOOD PRESSURE: 59 MMHG | RESPIRATION RATE: 18 BRPM | SYSTOLIC BLOOD PRESSURE: 124 MMHG | WEIGHT: 120 LBS | HEART RATE: 60 BPM | BODY MASS INDEX: 20.6 KG/M2

## 2017-07-26 LAB
ANION GAP SERPL CALC-SCNC: 5 MMOL/L
BASOPHILS # BLD AUTO: 0.01 K/UL
BASOPHILS NFR BLD: 0.3 %
BUN SERPL-MCNC: 12 MG/DL
CALCIUM SERPL-MCNC: 8.4 MG/DL
CHLORIDE SERPL-SCNC: 111 MMOL/L
CO2 SERPL-SCNC: 21 MMOL/L
CREAT SERPL-MCNC: 0.8 MG/DL
DIFFERENTIAL METHOD: ABNORMAL
EOSINOPHIL # BLD AUTO: 0.1 K/UL
EOSINOPHIL NFR BLD: 2.4 %
ERYTHROCYTE [DISTWIDTH] IN BLOOD BY AUTOMATED COUNT: 13.7 %
EST. GFR  (AFRICAN AMERICAN): >60 ML/MIN/1.73 M^2
EST. GFR  (NON AFRICAN AMERICAN): >60 ML/MIN/1.73 M^2
GLUCOSE SERPL-MCNC: 76 MG/DL
HCT VFR BLD AUTO: 31.2 %
HGB BLD-MCNC: 10.7 G/DL
LYMPHOCYTES # BLD AUTO: 1 K/UL
LYMPHOCYTES NFR BLD: 26.8 %
MAGNESIUM SERPL-MCNC: 1.7 MG/DL
MCH RBC QN AUTO: 31.7 PG
MCHC RBC AUTO-ENTMCNC: 34.3 G/DL
MCV RBC AUTO: 92 FL
MONOCYTES # BLD AUTO: 0.5 K/UL
MONOCYTES NFR BLD: 13 %
NEUTROPHILS # BLD AUTO: 2.2 K/UL
NEUTROPHILS NFR BLD: 57.2 %
PHOSPHATE SERPL-MCNC: 2.2 MG/DL
PLATELET # BLD AUTO: 92 K/UL
PMV BLD AUTO: 10.3 FL
POTASSIUM SERPL-SCNC: 3.7 MMOL/L
RBC # BLD AUTO: 3.38 M/UL
SODIUM SERPL-SCNC: 137 MMOL/L
WBC # BLD AUTO: 3.77 K/UL

## 2017-07-26 PROCEDURE — 83735 ASSAY OF MAGNESIUM: CPT

## 2017-07-26 PROCEDURE — 84100 ASSAY OF PHOSPHORUS: CPT

## 2017-07-26 PROCEDURE — 25000003 PHARM REV CODE 250: Performed by: PHYSICIAN ASSISTANT

## 2017-07-26 PROCEDURE — 36415 COLL VENOUS BLD VENIPUNCTURE: CPT

## 2017-07-26 PROCEDURE — 92526 ORAL FUNCTION THERAPY: CPT

## 2017-07-26 PROCEDURE — 99217 PR OBSERVATION CARE DISCHARGE: CPT | Mod: ,,, | Performed by: PHYSICIAN ASSISTANT

## 2017-07-26 PROCEDURE — G0378 HOSPITAL OBSERVATION PER HR: HCPCS

## 2017-07-26 PROCEDURE — 85025 COMPLETE CBC W/AUTO DIFF WBC: CPT

## 2017-07-26 PROCEDURE — 80048 BASIC METABOLIC PNL TOTAL CA: CPT

## 2017-07-26 RX ADMIN — AMLODIPINE BESYLATE 5 MG: 5 TABLET ORAL at 09:07

## 2017-07-26 RX ADMIN — METOPROLOL SUCCINATE 50 MG: 50 TABLET, EXTENDED RELEASE ORAL at 09:07

## 2017-07-26 RX ADMIN — GUAIFENESIN 1200 MG: 600 TABLET, EXTENDED RELEASE ORAL at 09:07

## 2017-07-26 RX ADMIN — MEMANTINE HYDROCHLORIDE 10 MG: 10 TABLET ORAL at 09:07

## 2017-07-26 RX ADMIN — LISINOPRIL 5 MG: 5 TABLET ORAL at 09:07

## 2017-07-26 RX ADMIN — ESCITALOPRAM OXALATE 10 MG: 10 TABLET ORAL at 09:07

## 2017-07-26 RX ADMIN — PANTOPRAZOLE SODIUM 40 MG: 40 TABLET, DELAYED RELEASE ORAL at 09:07

## 2017-07-26 RX ADMIN — ASPIRIN 81 MG: 81 TABLET, COATED ORAL at 09:07

## 2017-07-26 RX ADMIN — DONEPEZIL HYDROCHLORIDE 10 MG: 10 TABLET, FILM COATED ORAL at 09:07

## 2017-07-26 NOTE — PLAN OF CARE
Problem: Patient Care Overview  Goal: Plan of Care Review  Outcome: Ongoing (interventions implemented as appropriate)  Pt AAOx4, calm, vs stable, No Falls noted as fall precautions are active, No complains of pain, no signs or symptoms of skin breakdown noted, Family at bedside, No complaints at this time, will continue to monitor

## 2017-07-26 NOTE — PT/OT/SLP PROGRESS
Speech Language Pathology  Treatment    Margarita Castrejon   MRN: [id number]   Admitting Diagnosis: Abdominal pain    Diet recommendations: Solid Diet Level: Puree  Liquid Diet Level: Honey Thick                         To achieve honey thick liquid: 4 oz of any liquid to 1 pack of thickener                        No ice cream, jello, or ice.                        No Straw  Small bites/sips (1/2 spoonfuls)  Medications crushed or whole in puree    Assist with meals and with thickening  No straw      SLP Treatment Date: 07/26/17  Speech Start Time: 1037     Speech Stop Time: 1051     Speech Total (min): 14 min       TREATMENT BILLABLE MINUTES:  Treatment Swallowing Dysfunction 14    Has the patient been evaluated by SLP for swallowing? : Yes  Keep patient NPO?: No   General Precautions: Standard,            Subjective:  Pt awake with min stimulation; pleasant    Pain/Comfort  Pain Rating 1: 0/10  Pain Rating Post-Intervention 1: 0/10    Objective:     Pt tolerated 2 full spoonfuls puree with cough x1. Pt tolerated 1/2 spoonfuls nectar thick liquid with cough x1. Pt tolerated 4 1/2 spoonfuls puree and 3 full spoonfuls honey thick liquid without s/s of airway compromise. Overall congestion continues. Oral phase cb impaired labial seal and bolus manipulation; functional for these consistencies. Skilled education provided on aspiration precautions and diet recommendations. Whiteboard updated with diet recommendations/aspiration precautions.  No further questions. Rylan pastrana RN.                                    Assessment:  Margarita Castrejon is a 86 y.o. female with a medical diagnosis of Abdominal pain and presents with oropharyngeal dysphagia. continued progress towards goals.      Discharge recommendations: Discharge Facility/Level Of Care Needs:  (return to NH)     Goals:    SLP Goals        Problem: SLP Goal    Goal Priority Disciplines Outcome   SLP Goal     SLP    Description:  Speech Language Pathology Goals  Goals  expected to be met by 8/1  1. Pt will tolerate puree food and nectar thick liquid without s/s of airway compromise.   2. Pt will tolerate trials of dental soft food with SLP without s/s of airway compromise.   3. Pt will tolerate trials of thin liquid with SLP only without s/s of airway compromise.                         Plan:   Patient to be seen Therapy Frequency: 5 x/week   Plan of Care expires: 08/23/17  Plan of Care reviewed with: patient  SLP Follow-up?: Yes             Mayra Truong M.A. CCC-SLP  Speech Language Pathologist  [phone]  7/26/2017

## 2017-07-26 NOTE — HOSPITAL COURSE
85 yo female admitted to observation for further evaluation and treatment of abdominal pain. While in ED, VSS and initial labs unremarkable. UA clear. CT abd/pelvis with contrast complete showing large hiatal hernia, stable from previous. General surgery consulted and felt no intervention warranted. Placed in observation overnight for further management. Patient with resolution of symptoms, no further episodes during admission. SLP evaluated patient and recommend diet of pureed foods, nectar thick liquids. Patient remained afebrile and without leukocytosis during admission. Stable for discharge back to NH facility today.

## 2017-07-26 NOTE — PLAN OF CARE
[facility] has faxed n/h orders along w/clinical info to [facility], through [facility] fax system.  AVA contacted Acacia, admission coordinator, to notify that orders have been faxed.  Chelly, nurse, has been notified of scheduling of n/h return.    SW awaiting n/h approval to set up transport.    hSelley Sunshine, [facility]  [id number]

## 2017-07-26 NOTE — DISCHARGE SUMMARY
[facility] Hospital Medicine  Discharge Summary      Patient Name: Margarita Castrejon  MRN: [id number]  Admission Date: 7/24/2017  Hospital Length of Stay: 0 days  Discharge Date and Time:  07/26/2017 2:32 PM  Attending Physician: Vinnie Romano MD   Discharging Provider: Elenita Cohen PA-C  Primary Care Provider: Patrick Landa MD  [facility] Medicine Team: [facility] HOSP MED E Elenita Cohen PA-C    HPI:   86 year old female with past medical history dementia, CAD, h/o CVA, HLD, HTN, SSS s/p pacemaker, GERD, chronic diastolic CHF, and chronic bronchitis. She presents to ED today with daughter at bedside with complaints of RLQ abdominal pain. Per daughter, yesterday around 5-6 pm patient grabbed her right lower side and complained of 6/10 abdominal pain in the area. The NH physician directed patient to ED and upon arrival abdominal pain resolved. No fevers, chills, or sweats. Daughter states she had a BM while in ED that was normal. No GI blood loss reported, diarrhea, or N/V. She does note patient has decreased intake at time and weight has gone down over time (140>123 lbs) but [facility].     While in ED, VSS and initial labs unremarkable. UA clear. CT abd/pelvis with contrast complete showing large hiatal hernia, stable from previous. General surgery consulted and felt no intervention warranted. Placed in observation overnight for further management.        * No surgery found *      Indwelling Lines/Drains at time of discharge:   Lines/Drains/Airways          No matching active lines, drains, or airways        Hospital Course:   85 yo female admitted to observation for further evaluation and treatment of abdominal pain. While in ED, VSS and initial labs unremarkable. UA clear. CT abd/pelvis with contrast complete showing large hiatal hernia, stable from previous. General surgery consulted and felt no intervention warranted. Placed in observation overnight  for further management. Patient with resolution of symptoms, no further episodes during admission. SLP evaluated patient and recommend diet of pureed foods, nectar thick liquids. Patient remained afebrile and without leukocytosis during admission. Stable for discharge back to NH facility today.      Consults:   Consults         Status Ordering Provider     Inpatient consult to General surgery  Once     Provider:  (Not yet assigned)    Completed MARY FOREMAN          Significant Diagnostic Studies: Radiology: CT scan: CT ABDOMEN PELVIS WITH CONTRAST:   Results for orders placed or performed during the hospital encounter of 07/24/17   CT Abdomen Pelvis With Contrast    Narrative    Procedure comments: The patient was surveyed from the lung bases through the pelvis after the administration of 100 cc Omni 350 IV contrast as well as oral contrast and data was reconstructed for coronal, sagittal, and axial images.    Comparison: CT abdomen 3/7/2011.    Findings:     Bibasilar atelectatic changes are noted.  Calcified nodule in the right lung base, likely representing granuloma.  Post operative changes from prior median sternotomy are partially visualized.  Distal transvenous leads of AICD/cardiac pacer within the heart.  There is no pleural fluid present.  The visualized portions of the heart appear normal.    The liver demonstrates nodular contour, compatible with cirrhosis.  No evidence of focal hepatic abnormality.  No gallbladder is visualized, may be small or surgically absent. There is no intra-or extrahepatic biliary ductal dilatation.    There is herniation of the stomach with gastric varices and segments of the transverse colon and splenic flexure into the thorax demonstrating marked wall thickening.  The esophagus demonstrates air-fluid level.  This is worse when compared to prior examination from 2011 and may represent incarcerated herniated structures.  The pancreas, and adrenal glands are unremarkable.   The spleen is enlarged measuring 14.8 cm.    The kidneys are normal in size and concentrate and excrete contrast properly on delayed imaging.  Bilaterally renal hypodensities, too small to characterize.  There is no evidence of hydronephrosis.  The ureters appear normal in course and caliber without evidence of ureteral dilatation. The urinary bladder demonstrates circumferential mild wall thickening, may be secondary to nondistention.  The uterus is surgically absent.    The abdominal aorta tortuous with extensive calcific atherosclerosis along its course and branch vessels.  Calcified splenic artery aneurysm, unchanged.    There is liquid stool in the right kvng-colon.  The remaining visualized loops of small and large bowel show no evidence of obstruction or inflammation.  There is no ascites or intraperitoneal free air. There is no evidence of lymph node enlargement in the abdomen or pelvis.    When viewed with bone windows the osseous structures demonstrate degenerative and levoscoliosis of the lumbar spine.  Surgical clips in the bilateral inguinal region and lower LEFT abdominal wall.    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 Marked gastric wall thickening which may be secondary to infectious, inflammatory or ischemic gastritis.  The esophagus demonstrates air-fluid level.  This is worse when compared to prior examination from 2011 and may represent incarcerated herniated structures. Surgical consultation recommended. Upper endoscopy maybe helpful to further differentiate.    Cirrhotic liver and splenomegaly.    Extensive calcific atherosclerosis of the abdominal aorta. Calcified splenic artery aneurysm, unchanged.    Cholelithiasis and bile duct dilatation.    Prior hysterectomy.    Additional findings as above.      Findings were discussed with MARY FOREMAN at 01:19:11 07/25/17      This report has been flagged  in the [facility] medical record.      ______________________________________     Electronically signed by resident: CRISTI MEZA MD  Date:     07/25/17  Time:    01:01            As the supervising and teaching physician, I personally reviewed the images and resident's interpretation and I agree with the findings.          Electronically signed by: AGUS HOLLAND MD  Date:     07/25/17  Time:    01:22        Pending Diagnostic Studies:     None        Final Active Diagnoses:    Diagnosis Date Noted POA    PRINCIPAL PROBLEM:  Abdominal pain [R10.9] 07/25/2017 Yes    Hiatal hernia [K44.9] 07/25/2017 Yes    Chronic kidney disease, stage III (moderate) [N18.3] 06/26/2015 Yes    Alzheimer's dementia [G30.9] 06/24/2014 Yes    Hypertension [I10] 06/28/2013 Yes    Heart failure, diastolic, chronic [I50.32] 09/19/2016 Yes    Coronary artery disease [I25.10] 06/28/2013 Yes      Problems Resolved During this Admission:    Diagnosis Date Noted Date Resolved POA      * Abdominal pain    - RLQ abdominal pain, resolved since admission  - CT abd/pelvis with stable appearing large hiatal hernia otherwise no acute findings  - General surgery consulted in ED and no surgical cause for pain identified. Repair of hernia not warranted  - SLP consulted given reported dysphagia by daughter, recommend patient continue on pureed foods and nectar thick liquids  - No worsening pain or reflux overnight. Stable for discharge back to NH today. Recommend outpatient PCP follow up.   - Continue aspiration precautions        Hiatal hernia    - see plan above        Chronic kidney disease, stage III (moderate)    - SCr 1.1, improved to 0,8 with repeat. Near baseline.        Alzheimer's dementia    - continue home namenda and aricept  - Delirium precautions        Hypertension    - BP well controlled  - Resume home norvasc, lisinopril, and metoprolol        Heart failure, diastolic, chronic    - clinically euvolemic/slightly dry  - Hold lasix  overnight, ok to resume on discharge  - given IVFs in ED  - Monitor I/Os        Coronary artery disease    - no chest pain on exam  - Continue ASA and statin            Discharged Condition: stable    Disposition: Long Term Care    Follow Up:  Follow-up Information     Patrick Landa MD.    Specialty:  Internal Medicine  Why:  Please follow up with PCP in 2-3 weeks  Contact information:  [address]ne  [address]  [place] [id number] [phone]                 Patient Instructions:     Diet general   Order Specific Question Answer Comments   Additional restrictions: Pureed      Activity as tolerated     Call MD for:  severe uncontrolled pain     Call MD for:  temperature >100.4     Call MD for:  persistent nausea and vomiting or diarrhea     Call MD for:  difficulty breathing or increased cough       Medications:  Reconciled Home Medications:   Current Discharge Medication List      CONTINUE these medications which have NOT CHANGED    Details   alendronate (FOSAMAX) 70 MG tablet Take 1 tablet (70 mg total) by mouth every 7 days. 1 Tablet Oral Weekly  Qty: 13 tablet, Refills: 4    Associated Diagnoses: Osteoporosis      amlodipine (NORVASC) 5 MG tablet Take 1 tablet (5 mg total) by mouth once daily.  Qty: 90 tablet, Refills: 4    Associated Diagnoses: Essential hypertension      aspirin (ECOTRIN) 81 MG EC tablet Take 81 mg by mouth. 1 Tablet, Delayed Release (E.C.) Oral Every day.  available over the counter      CALCIUM CARBONATE/VITAMIN D3 (CALCIUM 600 + D,3, ORAL) Take by mouth 2 (two) times daily.      donepezil (ARICEPT) 10 MG tablet Take 1 tablet (10 mg total) by mouth once daily. 1 Tablet Oral Every day  Qty: 90 tablet, Refills: 4    Associated Diagnoses: Dementia, without behavioral disturbance      escitalopram oxalate (LEXAPRO) 10 MG tablet Take 1 tablet (10 mg total) by mouth once daily.  Qty: 90 tablet, Refills: 4    Associated Diagnoses: Dementia, without behavioral disturbance      furosemide  (LASIX) 20 MG tablet Take 20 mg by mouth once daily.       guaifenesin (MUCINEX) 600 mg 12 hr tablet Take 1,200 mg by mouth 2 (two) times daily.      levalbuterol (XOPENEX) 0.63 mg/3 mL nebulizer solution Take 3 mLs (0.63 mg total) by nebulization 2 (two) times daily as needed for Shortness of Breath.  Qty: 540 mL, Refills: PRN    Associated Diagnoses: Dyspnea      lisinopril (PRINIVIL,ZESTRIL) 5 MG tablet Take 1 tablet (5 mg total) by mouth once daily.  Qty: 90 tablet, Refills: 4    Associated Diagnoses: Essential hypertension      memantine (NAMENDA) 10 MG Tab Take 1 tablet (10 mg total) by mouth 2 (two) times daily.  Qty: 180 tablet, Refills: 4    Associated Diagnoses: Dementia, without behavioral disturbance      metoprolol succinate (TOPROL-XL) 50 MG 24 hr tablet Take 1 tablet (50 mg total) by mouth once daily.  Qty: 90 tablet, Refills: 4    Associated Diagnoses: Essential hypertension      potassium chloride SA (K-DUR,KLOR-CON) 20 MEQ tablet Take 1 tablet (20 mEq total) by mouth once daily.  Qty: 90 tablet, Refills: 4    Associated Diagnoses: Essential hypertension      acetaminophen (TYLENOL) 325 MG tablet Take 325 mg by mouth every 6 (six) hours as needed for Pain.      nitroGLYCERIN (NITROSTAT) 0.4 MG SL tablet 1 tab every 5 minutes as needed. maximum 3 tabs in 15 mins.  Qty: 30 tablet, Refills: PRN    Associated Diagnoses: Coronary artery disease involving native coronary artery without angina pectoris      polyethylene glycol (MIRALAX) 17 gram PwPk Take by mouth.      trazodone (DESYREL) 50 MG tablet Take 1 tablet (50 mg total) by mouth every evening.  Qty: 90 tablet, Refills: 4    Associated Diagnoses: Insomnia           Time spent on the discharge of patient: 30 minutes    Elenita Cohen PA-C  Department of Hospital Medicine  [facility]  Staff: Dr. Romano

## 2017-07-26 NOTE — PLAN OF CARE
Problem: Patient Care Overview  Goal: Plan of Care Review  Outcome: Ongoing (interventions implemented as appropriate)  Fall risk interventions ongoing patient oriented to call light bed alarm activated instructed to call for assistance when needed

## 2017-07-26 NOTE — PLAN OF CARE
Problem: SLP Goal  Goal: SLP Goal  Speech Language Pathology Goals  Goals expected to be met by 8/1  1. Pt will tolerate puree food and nectar thick liquid without s/s of airway compromise.   2. Pt will tolerate trials of dental soft food with SLP without s/s of airway compromise.   3. Pt will tolerate trials of thin liquid with SLP only without s/s of airway compromise.         Recommending;    HONEY thick liquids and puree foods   To achieve honey thick liquid: 4 oz of any liquid to 1 pack of thickener   No ice cream, jello, or ice.   No Straw      Continue with all aspiration precautions stated in SLP note 7/25.    Formal report to follow.    Mayra Truong M.A. CCC-SLP  Speech Language Pathologist  [phone]  7/26/2017

## 2017-07-26 NOTE — PLAN OF CARE
[facility]     Department of Hospital Medicine     [address] [place] [address]     [phone] after hours  [phone] fax       NURSING HOME ORDERS    07/26/2017    Admit to Nursing Home:  Regular Bed       Diagnoses:  Active Hospital Problems    Diagnosis  POA    *Abdominal pain [R10.9]  Yes    Hiatal hernia [K44.9]  Yes    Heart failure, diastolic, chronic [I50.32]  Yes     820/2016: CXR: Mild CHF. Minimal effusions.      Chronic kidney disease, stage III (moderate) [N18.3]  Yes     6/15/2015: BUN/crea 20/1.1. CrCl 46.      Alzheimer's dementia [G30.9]  Yes     2012: Diagnosed.      Coronary artery disease [I25.10]  Yes     1993: [facility]: CABG x 3.      Hypertension [I10]  Yes     1990: Diagnosed.        Resolved Hospital Problems    Diagnosis Date Resolved POA   No resolved problems to display.       Patient is homebound due to:  Abdominal pain    Allergies:  Review of patient's allergies indicates:   Allergen Reactions    Heparin analogues Shortness Of Breath    Phenytoin sodium extended Rash       Vitals:       Routine, once monthly    Diet: Diet pureed and honey thick liquids   To achieve honey thick liquid: 4 oz of any liquid to 1 pack of thickener                        No ice cream, jello, or ice.                        No Straw   Supplement:  1 can every three times a day with meals                         Type:  House        Acitivities:      - Up in a chair each morning as tolerated   - Ambulate with assistance to bathroom   - Scheduled walks once each shift (every 8 hours)      LABS:  Per facility protocol   CMP, CBC each month for 3 months   Pre-albumin each month for 3 months   TSH every year     Nursing Precautions:   - Aspiration precautions:             - Total assistance with meals            -  Upright 90 degrees befor during and after meals             -  Suction at bedside          - Fall precautions per nursing home protocol   -  Decubitus precautions:        -  for positioning   - Pressure reducing foam mattress   - Turn patient every two hours. Use wedge pillows to anchor patient    CONSULTS:     Physical Therapy to evaluate and treat     Occupational Therapy to evaluate and treat     Speech Therapy  to evaluate and treat     Nutrition to evaluate and recommend diet      MISCELLANEOUS CARE:       Routine Skin for Bedridden Patients:  Apply moisture barrier cream to all    skin folds and wet areas in perineal area daily and after baths and                           all bowel movements.    Medications: Discontinue all previous medication orders, if any. See new list below.       Margarita Castrejon   Home Medication Instructions LAURI:[id number]    Printed on:07/26/17 1122   Medication Information                      acetaminophen (TYLENOL) 325 MG tablet  Take 325 mg by mouth every 6 (six) hours as needed for Pain.             alendronate (FOSAMAX) 70 MG tablet  Take 1 tablet (70 mg total) by mouth every 7 days. 1 Tablet Oral Weekly             amlodipine (NORVASC) 5 MG tablet  Take 1 tablet (5 mg total) by mouth once daily.             aspirin (ECOTRIN) 81 MG EC tablet  Take 81 mg by mouth. 1 Tablet, Delayed Release (E.C.) Oral Every day.  available over the counter             CALCIUM CARBONATE/VITAMIN D3 (CALCIUM 600 + D,3, ORAL)  Take by mouth 2 (two) times daily.             donepezil (ARICEPT) 10 MG tablet  Take 1 tablet (10 mg total) by mouth once daily. 1 Tablet Oral Every day             escitalopram oxalate (LEXAPRO) 10 MG tablet  Take 1 tablet (10 mg total) by mouth once daily.             furosemide (LASIX) 20 MG tablet  Take 20 mg by mouth once daily.              guaifenesin (MUCINEX) 600 mg 12 hr tablet  Take 1,200 mg by mouth 2 (two) times daily.             levalbuterol (XOPENEX) 0.63 mg/3 mL nebulizer solution  Take 3 mLs (0.63 mg total) by nebulization 2 (two) times daily as needed for Shortness of Breath.              lisinopril (PRINIVIL,ZESTRIL) 5 MG tablet  Take 1 tablet (5 mg total) by mouth once daily.             memantine (NAMENDA) 10 MG Tab  Take 1 tablet (10 mg total) by mouth 2 (two) times daily.             metoprolol succinate (TOPROL-XL) 50 MG 24 hr tablet  Take 1 tablet (50 mg total) by mouth once daily.             nitroGLYCERIN (NITROSTAT) 0.4 MG SL tablet  1 tab every 5 minutes as needed. maximum 3 tabs in 15 mins.             polyethylene glycol (MIRALAX) 17 gram PwPk  Take by mouth.             potassium chloride SA (K-DUR,KLOR-CON) 20 MEQ tablet  Take 1 tablet (20 mEq total) by mouth once daily.             trazodone (DESYREL) 50 MG tablet  Take 1 tablet (50 mg total) by mouth every evening.                       _________________________________  Elenita Cohen PA-C  07/26/2017

## 2017-07-26 NOTE — PLAN OF CARE
Patrick Landa MD  [address] / [place] [address]    Future Appointments  Date Time Provider Department Center   12/1/2017 9:30 AM MD [facility]       Payor: MEDICARE / Plan: MEDICARE PART A & B / Product Type: Government /         07/26/17 1152   Discharge Assessment   Assessment Type Discharge Planning Assessment   Assessment information obtained from? Patient;Caregiver;Medical Record  (patient's daughter at the bedside )   Expected Length of Stay (days) 2   Communicated expected length of stay with patient/caregiver yes   Prior to hospitilization cognitive status: Unable to Assess   Prior to hospitalization functional status: Assistive Equipment   Current cognitive status: Alert/Oriented   Current Functional Status: Assistive Equipment   Arrived From snf care facility  ([facility])   Lives With facility resident   Able to Return to Prior Arrangements yes   Is patient able to care for self after discharge? No   Who are your caregiver(s) and their phone number(s)? (Maggy Rosenberg  daughter [phone])   Patient's perception of discharge disposition nursing home   Patient currently receives home health services? No   Patient currently receives any other outside agency services? No   Equipment Currently Used at Home (provided by nursing home facility)   Does the patient have transportation to healthcare appointments? Yes   Transportation Available van, wheelchair accessible  (agency)   On Dialysis? No   Discharge Plan A Return to nursing home   Discharge Plan B Return to Nursing Home   Patient/Family In Agreement With Plan yes

## 2017-12-23 ENCOUNTER — HOSPITAL ENCOUNTER (INPATIENT)
Facility: HOSPITAL | Age: 82
LOS: 5 days | Discharge: HOSPICE | DRG: 871 | End: 2017-12-28
Attending: EMERGENCY MEDICINE | Admitting: EMERGENCY MEDICINE
Payer: MEDICARE

## 2017-12-23 DIAGNOSIS — R40.20 LOC (LOSS OF CONSCIOUSNESS): ICD-10-CM

## 2017-12-23 DIAGNOSIS — Z71.89 GOALS OF CARE, COUNSELING/DISCUSSION: ICD-10-CM

## 2017-12-23 DIAGNOSIS — E86.0 DEHYDRATION: ICD-10-CM

## 2017-12-23 DIAGNOSIS — N30.00 ACUTE CYSTITIS WITHOUT HEMATURIA: Primary | ICD-10-CM

## 2017-12-23 DIAGNOSIS — R41.82 ALTERED MENTAL STATUS: ICD-10-CM

## 2017-12-23 DIAGNOSIS — I21.4 NSTEMI (NON-ST ELEVATED MYOCARDIAL INFARCTION): ICD-10-CM

## 2017-12-23 DIAGNOSIS — Z51.5 PALLIATIVE CARE ENCOUNTER: ICD-10-CM

## 2017-12-23 DIAGNOSIS — N17.9 AKI (ACUTE KIDNEY INJURY): ICD-10-CM

## 2017-12-23 PROBLEM — E87.0 HYPERNATREMIA: Status: ACTIVE | Noted: 2017-12-23

## 2017-12-23 PROBLEM — N18.9 ACUTE KIDNEY INJURY SUPERIMPOSED ON CHRONIC KIDNEY DISEASE: Status: ACTIVE | Noted: 2017-12-23

## 2017-12-23 PROBLEM — E83.52 HYPERCALCEMIA: Status: ACTIVE | Noted: 2017-12-23

## 2017-12-23 PROBLEM — I50.9 HEART FAILURE: Status: ACTIVE | Noted: 2017-12-23

## 2017-12-23 PROBLEM — A41.9 SEVERE SEPSIS: Status: ACTIVE | Noted: 2017-12-23

## 2017-12-23 PROBLEM — R65.20 SEVERE SEPSIS: Status: ACTIVE | Noted: 2017-12-23

## 2017-12-23 PROBLEM — R65.10 SIRS (SYSTEMIC INFLAMMATORY RESPONSE SYNDROME): Status: ACTIVE | Noted: 2017-12-23

## 2017-12-23 LAB
25(OH)D3+25(OH)D2 SERPL-MCNC: 35 NG/ML
ALBUMIN SERPL BCP-MCNC: 2 G/DL
ALP SERPL-CCNC: 104 U/L
ALT SERPL W/O P-5'-P-CCNC: 8 U/L
ANION GAP SERPL CALC-SCNC: 10 MMOL/L
AST SERPL-CCNC: 17 U/L
BACTERIA #/AREA URNS AUTO: ABNORMAL /HPF
BASOPHILS # BLD AUTO: 0.03 K/UL
BASOPHILS NFR BLD: 0.2 %
BILIRUB SERPL-MCNC: 1.4 MG/DL
BILIRUB UR QL STRIP: NEGATIVE
BNP SERPL-MCNC: 389 PG/ML
BUN SERPL-MCNC: 46 MG/DL
CALCIUM SERPL-MCNC: 11.5 MG/DL
CHLORIDE SERPL-SCNC: 113 MMOL/L
CLARITY UR REFRACT.AUTO: ABNORMAL
CO2 SERPL-SCNC: 29 MMOL/L
COLOR UR AUTO: YELLOW
CREAT SERPL-MCNC: 2.2 MG/DL
DIFFERENTIAL METHOD: ABNORMAL
EOSINOPHIL # BLD AUTO: 0 K/UL
EOSINOPHIL NFR BLD: 0.3 %
ERYTHROCYTE [DISTWIDTH] IN BLOOD BY AUTOMATED COUNT: 15.7 %
EST. GFR  (AFRICAN AMERICAN): 22.7 ML/MIN/1.73 M^2
EST. GFR  (NON AFRICAN AMERICAN): 19.7 ML/MIN/1.73 M^2
GLUCOSE SERPL-MCNC: 151 MG/DL
GLUCOSE UR QL STRIP: NEGATIVE
HCT VFR BLD AUTO: 39.5 %
HGB BLD-MCNC: 12.5 G/DL
HGB UR QL STRIP: ABNORMAL
HYALINE CASTS UR QL AUTO: 16 /LPF
IMM GRANULOCYTES # BLD AUTO: 0.09 K/UL
IMM GRANULOCYTES NFR BLD AUTO: 0.7 %
KETONES UR QL STRIP: NEGATIVE
LEUKOCYTE ESTERASE UR QL STRIP: ABNORMAL
LYMPHOCYTES # BLD AUTO: 1.4 K/UL
LYMPHOCYTES NFR BLD: 10.6 %
MCH RBC QN AUTO: 30.6 PG
MCHC RBC AUTO-ENTMCNC: 31.6 G/DL
MCV RBC AUTO: 97 FL
MICROSCOPIC COMMENT: ABNORMAL
MONOCYTES # BLD AUTO: 0.9 K/UL
MONOCYTES NFR BLD: 7.1 %
NEUTROPHILS # BLD AUTO: 10.6 K/UL
NEUTROPHILS NFR BLD: 81.1 %
NITRITE UR QL STRIP: NEGATIVE
NRBC BLD-RTO: 0 /100 WBC
PH UR STRIP: 7 [PH] (ref 5–8)
PLATELET # BLD AUTO: 144 K/UL
PMV BLD AUTO: 10.9 FL
POTASSIUM SERPL-SCNC: 4.1 MMOL/L
PROT SERPL-MCNC: 7.2 G/DL
PROT UR QL STRIP: ABNORMAL
PTH-INTACT SERPL-MCNC: 31 PG/ML
RBC # BLD AUTO: 4.09 M/UL
RBC #/AREA URNS AUTO: 2 /HPF (ref 0–4)
SODIUM SERPL-SCNC: 152 MMOL/L
SP GR UR STRIP: 1.01 (ref 1–1.03)
TROPONIN I SERPL DL<=0.01 NG/ML-MCNC: 0.27 NG/ML
TSH SERPL DL<=0.005 MIU/L-ACNC: 0.52 UIU/ML
URN SPEC COLLECT METH UR: ABNORMAL
UROBILINOGEN UR STRIP-ACNC: NEGATIVE EU/DL
WBC # BLD AUTO: 13.11 K/UL
WBC #/AREA URNS AUTO: 78 /HPF (ref 0–5)
WBC CLUMPS UR QL AUTO: ABNORMAL

## 2017-12-23 PROCEDURE — 87086 URINE CULTURE/COLONY COUNT: CPT

## 2017-12-23 PROCEDURE — 96361 HYDRATE IV INFUSION ADD-ON: CPT

## 2017-12-23 PROCEDURE — 81001 URINALYSIS AUTO W/SCOPE: CPT

## 2017-12-23 PROCEDURE — 82306 VITAMIN D 25 HYDROXY: CPT

## 2017-12-23 PROCEDURE — 25000003 PHARM REV CODE 250: Performed by: INTERNAL MEDICINE

## 2017-12-23 PROCEDURE — 99285 EMERGENCY DEPT VISIT HI MDM: CPT | Mod: ,,, | Performed by: NURSE PRACTITIONER

## 2017-12-23 PROCEDURE — 83880 ASSAY OF NATRIURETIC PEPTIDE: CPT

## 2017-12-23 PROCEDURE — 99285 EMERGENCY DEPT VISIT HI MDM: CPT | Mod: 25

## 2017-12-23 PROCEDURE — 11000001 HC ACUTE MED/SURG PRIVATE ROOM

## 2017-12-23 PROCEDURE — 93005 ELECTROCARDIOGRAM TRACING: CPT

## 2017-12-23 PROCEDURE — 99223 1ST HOSP IP/OBS HIGH 75: CPT | Mod: AI,GC,, | Performed by: HOSPITALIST

## 2017-12-23 PROCEDURE — 84443 ASSAY THYROID STIM HORMONE: CPT

## 2017-12-23 PROCEDURE — 85025 COMPLETE CBC W/AUTO DIFF WBC: CPT

## 2017-12-23 PROCEDURE — 80053 COMPREHEN METABOLIC PANEL: CPT

## 2017-12-23 PROCEDURE — 87186 SC STD MICRODIL/AGAR DIL: CPT | Mod: 59

## 2017-12-23 PROCEDURE — 87077 CULTURE AEROBIC IDENTIFY: CPT

## 2017-12-23 PROCEDURE — 63600175 PHARM REV CODE 636 W HCPCS: Performed by: NURSE PRACTITIONER

## 2017-12-23 PROCEDURE — 87040 BLOOD CULTURE FOR BACTERIA: CPT

## 2017-12-23 PROCEDURE — 96374 THER/PROPH/DIAG INJ IV PUSH: CPT

## 2017-12-23 PROCEDURE — 84484 ASSAY OF TROPONIN QUANT: CPT

## 2017-12-23 PROCEDURE — 25000003 PHARM REV CODE 250: Performed by: NURSE PRACTITIONER

## 2017-12-23 PROCEDURE — 93010 ELECTROCARDIOGRAM REPORT: CPT | Mod: ,,, | Performed by: INTERNAL MEDICINE

## 2017-12-23 PROCEDURE — 83970 ASSAY OF PARATHORMONE: CPT

## 2017-12-23 PROCEDURE — 87088 URINE BACTERIA CULTURE: CPT

## 2017-12-23 RX ORDER — SODIUM CHLORIDE 0.9 % (FLUSH) 0.9 %
3 SYRINGE (ML) INJECTION
Status: DISCONTINUED | OUTPATIENT
Start: 2017-12-23 | End: 2017-12-28 | Stop reason: HOSPADM

## 2017-12-23 RX ORDER — DEXTROSE MONOHYDRATE 50 MG/ML
INJECTION, SOLUTION INTRAVENOUS CONTINUOUS
Status: DISCONTINUED | OUTPATIENT
Start: 2017-12-23 | End: 2017-12-25

## 2017-12-23 RX ORDER — CEFTRIAXONE 1 G/1
1 INJECTION, POWDER, FOR SOLUTION INTRAMUSCULAR; INTRAVENOUS
Status: DISCONTINUED | OUTPATIENT
Start: 2017-12-24 | End: 2017-12-23

## 2017-12-23 RX ORDER — ACETAMINOPHEN 325 MG/1
650 TABLET ORAL EVERY 4 HOURS PRN
Status: DISCONTINUED | OUTPATIENT
Start: 2017-12-23 | End: 2017-12-28 | Stop reason: HOSPADM

## 2017-12-23 RX ORDER — CEFTRIAXONE 1 G/1
1 INJECTION, POWDER, FOR SOLUTION INTRAMUSCULAR; INTRAVENOUS
Status: COMPLETED | OUTPATIENT
Start: 2017-12-23 | End: 2017-12-23

## 2017-12-23 RX ADMIN — DEXTROSE MONOHYDRATE: 5 INJECTION, SOLUTION INTRAVENOUS at 07:12

## 2017-12-23 RX ADMIN — CEFTRIAXONE SODIUM 1 G: 1 INJECTION, POWDER, FOR SOLUTION INTRAMUSCULAR; INTRAVENOUS at 04:12

## 2017-12-23 RX ADMIN — SODIUM CHLORIDE 500 ML: 900 INJECTION, SOLUTION INTRAVENOUS at 04:12

## 2017-12-23 NOTE — ED PROVIDER NOTES
Encounter Date: 12/23/2017    SCRIBE #1 NOTE: I, Marquise Ordoñez, am scribing for, and in the presence of,  Dr. Noel. I have scribed the following portions of the note - the EKG reading.       History     Chief Complaint   Patient presents with    Altered Mental Status     HPI   This is a 86 y.o. female with a pmh significant for Alzheimer's Disease, CAD, CVA, HTN, HLD and SVT who was brought in the ED today by her daughter from the nursing home where she resides for evaluation of decline in her physical and mental status that was first noticed one week ago. The daughter states that the patient has been reliant on assistance with ADLs for a while now but previously was able to stand with assistance so that the aids could dress her, this week she has been getting dressed while lying in the bed. Daughter also states that her mother was previously able to communicate verbally, although confused. She has recently not been communicative. Daughter states that patient was admitted at [facility] about a month ago with urosepsis. States that she has been well since hospital discharge and has not complained of any pain or symptoms but wants to have her evaluated.    Review of patient's allergies indicates:   Allergen Reactions    Heparin analogues Shortness Of Breath    Phenytoin sodium extended Rash     Past Medical History:   Diagnosis Date    Alzheimer disease     Coronary artery disease 6/28/2013 1993. CABG x 3. [facility].    CVA (cerebrovascular accident) 6/28/2013 2009.    Dementia     Depression     GERD (gastroesophageal reflux disease)     Heart failure, diastolic, acute on chronic 9/19/2016    820/2016: CXR: Mild CHF. Minimal effusions.    HTN (hypertension)     Hypercholesteremia 6/28/2013    Hypertension, benign 6/28/2013    Diagnosed 1990.    Osteoporosis, unspecified     Pacemaker 6/28/2013    10/22/2012: [facility]PM. Merlin not on because she is at NH.    Renal disorder     SSS (sick sinus  syndrome) 6/28/2013    10/22/2012: [facility].    Supraventricular tachycardia 11/18/2014     Past Surgical History:   Procedure Laterality Date    HERNIA REPAIR      kidney surgery       History reviewed. No pertinent family history.  Social History   Substance Use Topics    Smoking status: Former Smoker     Packs/day: 1.00     Years: 40.00     Types: Cigarettes     Start date: 1/22/1969     Quit date: 1/1/2009    Smokeless tobacco: Never Used    Alcohol use No     Review of Systems   Unable to perform ROS: Dementia        Physical Exam     Initial Vitals [12/23/17 1319]   BP Pulse Resp Temp SpO2   103/67 60 20 97.4 °F (36.3 °C) 98 %      MAP       79         Physical Exam   Nursing note and vitals reviewed.  Constitutional: Patient appears elderly and frail.   HENT:   Head: Normocephalic and atraumatic.   Eyes: Conjunctivae are normal. No scleral icterus.   Neck: unable to assess.   Cardiovascular: Normal rate, regular rhythm and normal heart sounds. No peripheral edema.  Pulmonary/Chest: Rhonchi noted to bilateral upper lobes. No respiratory distress.  Abdominal: Soft. Normal bowel sounds. No pain elicited with palpation.  Musculoskeletal: unable to assess.   Neurological: Pt is non verbal.  Skin: Skin is warm and dry.    Psychiatric: Pt is non verbal. Flat affect.    ED Course   Procedures  Labs Reviewed   CBC W/ AUTO DIFFERENTIAL - Abnormal; Notable for the following:        Result Value    WBC 13.11 (*)     MCHC 31.6 (*)     RDW 15.7 (*)     Platelets 144 (*)     Immature Granulocytes 0.7 (*)     Gran # 10.6 (*)     Immature Grans (Abs) 0.09 (*)     Gran% 81.1 (*)     Lymph% 10.6 (*)     All other components within normal limits   COMPREHENSIVE METABOLIC PANEL - Abnormal; Notable for the following:     Sodium 152 (*)     Chloride 113 (*)     Glucose 151 (*)     BUN, Bld 46 (*)     Creatinine 2.2 (*)     Calcium 11.5 (*)     Albumin 2.0 (*)     Total Bilirubin 1.4 (*)     ALT 8 (*)     eGFR if   22.7 (*)     eGFR if non  19.7 (*)     All other components within normal limits   TROPONIN I - Abnormal; Notable for the following:     Troponin I 0.267 (*)     All other components within normal limits   B-TYPE NATRIURETIC PEPTIDE - Abnormal; Notable for the following:      (*)     All other components within normal limits   URINALYSIS, REFLEX TO URINE CULTURE - Abnormal; Notable for the following:     Appearance, UA Cloudy (*)     Protein, UA 1+ (*)     Occult Blood UA 1+ (*)     Leukocytes, UA 3+ (*)     All other components within normal limits    Narrative:     Preferred Collection Type->Urine, Catheterized   URINALYSIS MICROSCOPIC - Abnormal; Notable for the following:     WBC, UA 78 (*)     Bacteria, UA Many (*)     Hyaline Casts, UA 16 (*)     All other components within normal limits    Narrative:     Preferred Collection Type->Urine, Catheterized   CULTURE, URINE   TSH   PTH, INTACT   VITAMIN D     EKG Readings: (Independently Interpreted)   Paced rhythm, T wave inversions in V1 and V2, no significant change when compared to June 5, 2015           Medical Decision Making:   Independently Interpreted Test(s):   I have ordered and independently interpreted EKG Reading(s) - see prior notes  Clinical Tests:   Lab Tests: Ordered and Reviewed  Radiological Study: Ordered and Reviewed  Medical Tests: Ordered and Reviewed  ED Management:  86 y.o. female brought in from the nursing home by her daughter for decline from baseline mental status and physical status.  Patient has a history of Alzheimer's dementia and requires assistance with her ADLs and has chronic confusion but the daughter reports worsening from baseline over the course of the past week.  Concerning findings in the ED include UTI, elevated troponin, hypernatremia, dehydration and acute kidney injury.  She is afebrile and her vital signs are stable.  Plan to admit to internal medicine for further evaluation and  treatment.  IV fluids and IV Rocephin initiated in the emergency department.            Scribe Attestation:   Scribe #1: I performed the above scribed service and the documentation accurately describes the services I performed. I attest to the accuracy of the note.            ED Course      Clinical Impression:   The primary encounter diagnosis was Acute cystitis without hematuria. Diagnoses of LOC (loss of consciousness), Altered mental status, NSTEMI (non-ST elevated myocardial infarction), Dehydration, and LUCIEN (acute kidney injury) were also pertinent to this visit.                           Denice Call NP  12/24/17 3712

## 2017-12-23 NOTE — PROVIDER PROGRESS NOTES - EMERGENCY DEPT.
Encounter Date: 12/23/2017    SCRIBE NOTE: Paxton COREA am scribing for, and in the presence of, Jasson Boyle MD.      ED Physician Progress Notes         EKG - STEMI Decision  Initial Reading: No STEMI present.

## 2017-12-23 NOTE — ED NOTES
Pt laying in bed, continues, does not answer questions asked, resp even, non-labored.  Granddaughter at bedside.  Siderails up x 2, call light in reach of granddaughter.

## 2017-12-23 NOTE — ED NOTES
"Family stated "she has declined in the last week, she used to be able to stand with a walker, but cant anymore, she was at [facility] a month ago and she was septic with UTI and possible pneumonia".   "

## 2017-12-23 NOTE — ED NOTES
Diaper saturated with urine.  Pt cleaned and diaper changed.  Pt reposition with assistance of daughter in room.  Siderails up x2/daughter oriented to call light.

## 2017-12-23 NOTE — ED NOTES
LOC: The patient is awake, alert and aware of environment with an appropriate affect, the patient is oriented x 3 and speaking appropriately.  APPEARANCE: Patient resting comfortably and in no acute distress, patient is clean and well groomed, patient's clothing is properly fastened.  SKIN: The skin is warm and dry, color consistent with ethnicity, patient has delayed skin turgor and sticky mucus membranes, excoriation noted to right hip, with 2 cm line of redness and healing blister to sacral area.  Daughter reports nursing home has been placing ointment on the areas..  MUSCULOSKELETAL: Patient moving all extremities spontaneously, no obvious swelling or deformities noted.  RESPIRATORY: Airway is open and patent, respirations are spontaneous, patient has a normal effort and rate, no accessory muscle use noted.  CARDIAC: Patient has a normal rate and regular rhythm, no periphreal edema noted.  ABDOMEN: Soft and non tender to palpation, no distention noted.  NEUROLOGIC:  facial expression is symmetrical, patient moving all extremities spontaneously, pt does not follow commands.

## 2017-12-24 PROBLEM — G93.40 ACUTE ENCEPHALOPATHY: Status: ACTIVE | Noted: 2017-12-24

## 2017-12-24 LAB
ANION GAP SERPL CALC-SCNC: 8 MMOL/L
ANION GAP SERPL CALC-SCNC: 9 MMOL/L
BASOPHILS # BLD AUTO: 0.03 K/UL
BASOPHILS NFR BLD: 0.2 %
BUN SERPL-MCNC: 45 MG/DL
BUN SERPL-MCNC: 45 MG/DL
CALCIUM SERPL-MCNC: 10.8 MG/DL
CALCIUM SERPL-MCNC: 10.9 MG/DL
CHLORIDE SERPL-SCNC: 113 MMOL/L
CHLORIDE SERPL-SCNC: 114 MMOL/L
CO2 SERPL-SCNC: 29 MMOL/L
CO2 SERPL-SCNC: 29 MMOL/L
CREAT SERPL-MCNC: 1.9 MG/DL
CREAT SERPL-MCNC: 2 MG/DL
CREAT UR-MCNC: 78 MG/DL
DIFFERENTIAL METHOD: ABNORMAL
EOSINOPHIL # BLD AUTO: 0.1 K/UL
EOSINOPHIL NFR BLD: 0.5 %
ERYTHROCYTE [DISTWIDTH] IN BLOOD BY AUTOMATED COUNT: 15.8 %
EST. GFR  (AFRICAN AMERICAN): 25.5 ML/MIN/1.73 M^2
EST. GFR  (AFRICAN AMERICAN): 27.1 ML/MIN/1.73 M^2
EST. GFR  (NON AFRICAN AMERICAN): 22.1 ML/MIN/1.73 M^2
EST. GFR  (NON AFRICAN AMERICAN): 23.5 ML/MIN/1.73 M^2
GLUCOSE SERPL-MCNC: 119 MG/DL
GLUCOSE SERPL-MCNC: 126 MG/DL
HCT VFR BLD AUTO: 36.1 %
HGB BLD-MCNC: 11.6 G/DL
IMM GRANULOCYTES # BLD AUTO: 0.09 K/UL
IMM GRANULOCYTES NFR BLD AUTO: 0.7 %
LYMPHOCYTES # BLD AUTO: 1.4 K/UL
LYMPHOCYTES NFR BLD: 10.9 %
MAGNESIUM SERPL-MCNC: 2.4 MG/DL
MCH RBC QN AUTO: 31 PG
MCHC RBC AUTO-ENTMCNC: 32.1 G/DL
MCV RBC AUTO: 97 FL
MONOCYTES # BLD AUTO: 1.1 K/UL
MONOCYTES NFR BLD: 8.6 %
NEUTROPHILS # BLD AUTO: 10.3 K/UL
NEUTROPHILS NFR BLD: 79.1 %
NRBC BLD-RTO: 0 /100 WBC
PHOSPHATE SERPL-MCNC: 3.5 MG/DL
PLATELET # BLD AUTO: 133 K/UL
PMV BLD AUTO: 10.9 FL
POTASSIUM SERPL-SCNC: 3.7 MMOL/L
POTASSIUM SERPL-SCNC: 3.8 MMOL/L
RBC # BLD AUTO: 3.74 M/UL
SODIUM SERPL-SCNC: 150 MMOL/L
SODIUM SERPL-SCNC: 152 MMOL/L
UUN UR-MCNC: 592 MG/DL
WBC # BLD AUTO: 13.04 K/UL

## 2017-12-24 PROCEDURE — 63600175 PHARM REV CODE 636 W HCPCS: Performed by: HOSPITALIST

## 2017-12-24 PROCEDURE — 80048 BASIC METABOLIC PNL TOTAL CA: CPT | Mod: 91

## 2017-12-24 PROCEDURE — 92610 EVALUATE SWALLOWING FUNCTION: CPT

## 2017-12-24 PROCEDURE — 83735 ASSAY OF MAGNESIUM: CPT

## 2017-12-24 PROCEDURE — 99233 SBSQ HOSP IP/OBS HIGH 50: CPT | Mod: GC,,, | Performed by: HOSPITALIST

## 2017-12-24 PROCEDURE — 82570 ASSAY OF URINE CREATININE: CPT

## 2017-12-24 PROCEDURE — 87040 BLOOD CULTURE FOR BACTERIA: CPT

## 2017-12-24 PROCEDURE — G8997 SWALLOW GOAL STATUS: HCPCS | Mod: CK

## 2017-12-24 PROCEDURE — 11000001 HC ACUTE MED/SURG PRIVATE ROOM

## 2017-12-24 PROCEDURE — G8996 SWALLOW CURRENT STATUS: HCPCS | Mod: CN

## 2017-12-24 PROCEDURE — 84100 ASSAY OF PHOSPHORUS: CPT

## 2017-12-24 PROCEDURE — 85025 COMPLETE CBC W/AUTO DIFF WBC: CPT

## 2017-12-24 PROCEDURE — 25000003 PHARM REV CODE 250: Performed by: HOSPITALIST

## 2017-12-24 PROCEDURE — 36415 COLL VENOUS BLD VENIPUNCTURE: CPT

## 2017-12-24 PROCEDURE — 82652 VIT D 1 25-DIHYDROXY: CPT

## 2017-12-24 PROCEDURE — 84540 ASSAY OF URINE/UREA-N: CPT

## 2017-12-24 RX ADMIN — DEXTROSE 1 G: 50 INJECTION, SOLUTION INTRAVENOUS at 12:12

## 2017-12-24 NOTE — ASSESSMENT & PLAN NOTE
Normal home medications including donepezil and memantine   -Will need SLP swallow evaluation  -Resume medications when patient safely tolerating oral intake

## 2017-12-24 NOTE — HPI
Margarita Castrejon is a 86 y.o. with Alzheimers, CAD, CVA, hypertension, hyperlipidemia, CHF and SVT who presented to the [facility] emergency department from the nursing home where she resides with altered mental status.  Patient is currently non-verbal and the history was obtained from her daughter who brought her to the ED.  Her daughter reports that at her baseline she is able to stand with a walker/ aids, assist with dressing by aids, and is normally able to communicate verbally.  She also reports that she was hospitalized at [facility] for urosepsis.

## 2017-12-24 NOTE — ED NOTES
Pt with urine to diaper, pt cleaned and diaper changed, pt repositioned.  Siderails up x 2/call light in reach

## 2017-12-24 NOTE — ASSESSMENT & PLAN NOTE
Patients family reports recent diagnosis of heart failure.  No recent echocardiogram in records  -Will hold lasix and ACEi in setting of LUCIEN  -Resume beta blocker when able

## 2017-12-24 NOTE — NURSING
IM 2 contacted:  Patient had asymptomatic 25 second run of monomorphic narrow vtach at 1038.   May do in and out cath for urine specimen.

## 2017-12-24 NOTE — ASSESSMENT & PLAN NOTE
Urinalysis positive with 78 WBC and many bacteria.  -Urine culture obtained will follow up  -Given 1g Rocephin in the ED, will continue and tailor antibiotics according to culture result

## 2017-12-24 NOTE — H&P
[facility] Hospital Medicine  History & Physical    Patient Name: Margarita Castrejon  MRN: [id number]  Admission Date: 12/23/2017  Attending Physician: Susie Leigh MD   Primary Care Provider: Patrick Landa MD    [facility] Medicine Team: [facility] [place] 2 Eduardo Fitzgerald MD     Patient information was obtained from relative(s) and ER records.     Subjective:     Principal Problem:SIRS (systemic inflammatory response syndrome)    Chief Complaint:   Chief Complaint   Patient presents with    Altered Mental Status        HPI: Margarita Castrejon is a 86 y.o. with Alzheimers, CAD, CVA, hypertension, hyperlipidemia, CHF and SVT who [place] [facility] [place] home where she resides with altered mental status.  Patient is currently non-verbal and the history was obtained from her daughter who brought her to the ED.  Her daughter reports that at her baseline she is able to stand with a walker/ aids, assist with dressing by aids, and is normally able to communicate verbally.  She also reports that she was hospitalized at [facility] for urosepsis.      Past Medical History:   Diagnosis Date    Alzheimer disease     Coronary artery disease 6/28/2013 1993. CABG x 3. EJ.    CVA (cerebrovascular accident) 6/28/2013 2009.    Dementia     Depression     GERD (gastroesophageal reflux disease)     Heart failure, diastolic, acute on chronic 9/19/2016    820/2016: CXR: Mild CHF. Minimal effusions.    HTN (hypertension)     Hypercholesteremia 6/28/2013    Hypertension, benign 6/28/2013    Diagnosed 1990.    Osteoporosis, unspecified     Pacemaker 6/28/2013    10/22/2012: [facility] DDDR PPM. Merlin not on because she is at NH.    Renal disorder     SSS (sick sinus syndrome) 6/28/2013    10/22/2012: [facility].    Supraventricular tachycardia 11/18/2014       Past Surgical History:   Procedure Laterality Date    HERNIA REPAIR      kidney surgery          Review of patient's allergies indicates:   Allergen Reactions    Heparin analogues Shortness Of Breath    Phenytoin sodium extended Rash       No current facility-administered medications on file prior to encounter.      Current Outpatient Prescriptions on File Prior to Encounter   Medication Sig    acetaminophen (TYLENOL) 325 MG tablet Take 325 mg by mouth every 6 (six) hours as needed for Pain.    alendronate (FOSAMAX) 70 MG tablet Take 1 tablet (70 mg total) by mouth every 7 days. 1 Tablet Oral Weekly    amlodipine (NORVASC) 5 MG tablet Take 1 tablet (5 mg total) by mouth once daily.    aspirin (ECOTRIN) 81 MG EC tablet Take 81 mg by mouth. 1 Tablet, Delayed Release (E.C.) Oral Every day.  available over the counter    CALCIUM CARBONATE/VITAMIN D3 (CALCIUM 600 + D,3, ORAL) Take by mouth 2 (two) times daily.    donepezil (ARICEPT) 10 MG tablet Take 1 tablet (10 mg total) by mouth once daily. 1 Tablet Oral Every day    escitalopram oxalate (LEXAPRO) 10 MG tablet Take 1 tablet (10 mg total) by mouth once daily.    furosemide (LASIX) 20 MG tablet Take 20 mg by mouth once daily.     guaifenesin (MUCINEX) 600 mg 12 hr tablet Take 1,200 mg by mouth 2 (two) times daily.    levalbuterol (XOPENEX) 0.63 mg/3 mL nebulizer solution Take 3 mLs (0.63 mg total) by nebulization 2 (two) times daily as needed for Shortness of Breath.    lisinopril (PRINIVIL,ZESTRIL) 5 MG tablet Take 1 tablet (5 mg total) by mouth once daily.    memantine (NAMENDA) 10 MG Tab Take 1 tablet (10 mg total) by mouth 2 (two) times daily.    metoprolol succinate (TOPROL-XL) 50 MG 24 hr tablet Take 1 tablet (50 mg total) by mouth once daily.    nitroGLYCERIN (NITROSTAT) 0.4 MG SL tablet 1 tab every 5 minutes as needed. maximum 3 tabs in 15 mins.    potassium chloride SA (K-DUR,KLOR-CON) 20 MEQ tablet Take 1 tablet (20 mEq total) by mouth once daily.    trazodone (DESYREL) 50 MG tablet Take 1 tablet (50 mg total) by mouth every  evening.    polyethylene glycol (MIRALAX) 17 gram PwPk Take by mouth.     Family History     None        Social History Main Topics    Smoking status: Former Smoker     Packs/day: 1.00     Years: 40.00     Types: Cigarettes     Start date: 1/22/1969     Quit date: 1/1/2009    Smokeless tobacco: Never Used    Alcohol use No    Drug use: No    Sexual activity: Not Currently     Review of Systems   Unable to perform ROS: Patient nonverbal     Objective:     Vital Signs (Most Recent):  Temp: 98.9 °F (37.2 °C) (12/23/17 1801)  Pulse: 63 (12/23/17 1801)  Resp: 20 (12/23/17 1801)  BP: 132/64 (12/23/17 1801)  SpO2: 98 % (12/23/17 1801) Vital Signs (24h Range):  Temp:  [97.4 °F (36.3 °C)-99.2 °F (37.3 °C)] 98.9 °F (37.2 °C)  Pulse:  [60-65] 63  Resp:  [20] 20  SpO2:  [98 %-100 %] 98 %  BP: (103-132)/(60-67) 132/64        There is no height or weight on file to calculate BMI.    Physical Exam   Constitutional:   86 year old female appearing stated age, nonverbal, does not appear to be in acute distress   HENT:   Head: Normocephalic and atraumatic.   Eyes: EOM are normal. Pupils are equal, round, and reactive to light. No scleral icterus.   Neck: Normal range of motion. Neck supple. No JVD present. No tracheal deviation present.   Cardiovascular: Normal rate, regular rhythm and normal heart sounds.  Exam reveals no gallop and no friction rub.    No murmur heard.  Pulmonary/Chest: Effort normal and breath sounds normal. No stridor. No respiratory distress. She has no wheezes. She has no rales.   Abdominal: Soft. She exhibits no distension and no mass. There is no guarding.   Musculoskeletal: She exhibits no edema or deformity.   Neurological:   Patient non-verbal   Skin: Skin is warm and dry.         CRANIAL NERVES     CN III, IV, VI   Pupils are equal, round, and reactive to light.  Extraocular motions are normal.        Significant Labs:   Recent Results (from the past 24 hour(s))   CBC auto differential    Collection  Time: 12/23/17  2:46 PM   Result Value Ref Range    WBC 13.11 (H) 3.90 - 12.70 K/uL    RBC 4.09 4.00 - 5.40 M/uL    Hemoglobin 12.5 12.0 - 16.0 g/dL    Hematocrit 39.5 37.0 - 48.5 %    MCV 97 82 - 98 fL    MCH 30.6 27.0 - 31.0 pg    MCHC 31.6 (L) 32.0 - 36.0 g/dL    RDW 15.7 (H) 11.5 - 14.5 %    Platelets 144 (L) 150 - 350 K/uL    MPV 10.9 9.2 - 12.9 fL    Immature Granulocytes 0.7 (H) 0.0 - 0.5 %    Gran # 10.6 (H) 1.8 - 7.7 K/uL    Immature Grans (Abs) 0.09 (H) 0.00 - 0.04 K/uL    Lymph # 1.4 1.0 - 4.8 K/uL    Mono # 0.9 0.3 - 1.0 K/uL    Eos # 0.0 0.0 - 0.5 K/uL    Baso # 0.03 0.00 - 0.20 K/uL    nRBC 0 0 /100 WBC    Gran% 81.1 (H) 38.0 - 73.0 %    Lymph% 10.6 (L) 18.0 - 48.0 %    Mono% 7.1 4.0 - 15.0 %    Eosinophil% 0.3 0.0 - 8.0 %    Basophil% 0.2 0.0 - 1.9 %    Differential Method Automated    Comprehensive metabolic panel    Collection Time: 12/23/17  2:46 PM   Result Value Ref Range    Sodium 152 (H) 136 - 145 mmol/L    Potassium 4.1 3.5 - 5.1 mmol/L    Chloride 113 (H) 95 - 110 mmol/L    CO2 29 23 - 29 mmol/L    Glucose 151 (H) 70 - 110 mg/dL    BUN, Bld 46 (H) 8 - 23 mg/dL    Creatinine 2.2 (H) 0.5 - 1.4 mg/dL    Calcium 11.5 (H) 8.7 - 10.5 mg/dL    Total Protein 7.2 6.0 - 8.4 g/dL    Albumin 2.0 (L) 3.5 - 5.2 g/dL    Total Bilirubin 1.4 (H) 0.1 - 1.0 mg/dL    Alkaline Phosphatase 104 55 - 135 U/L    AST 17 10 - 40 U/L    ALT 8 (L) 10 - 44 U/L    Anion Gap 10 8 - 16 mmol/L    eGFR if African American 22.7 (A) >60 mL/min/1.73 m^2    eGFR if non  19.7 (A) >60 mL/min/1.73 m^2   Troponin I    Collection Time: 12/23/17  2:46 PM   Result Value Ref Range    Troponin I 0.267 (H) 0.000 - 0.026 ng/mL   Brain natriuretic peptide    Collection Time: 12/23/17  2:46 PM   Result Value Ref Range     (H) 0 - 99 pg/mL   Urinalysis, Reflex to Urine Culture Urine, Catheterized    Collection Time: 12/23/17  2:46 PM   Result Value Ref Range    Specimen UA Urine, Catheterized     Color, UA Yellow Yellow,  Straw, Kathryn    Appearance, UA Cloudy (A) Clear    pH, UA 7.0 5.0 - 8.0    Specific Gravity, UA 1.010 1.005 - 1.030    Protein, UA 1+ (A) Negative    Glucose, UA Negative Negative    Ketones, UA Negative Negative    Bilirubin (UA) Negative Negative    Occult Blood UA 1+ (A) Negative    Nitrite, UA Negative Negative    Urobilinogen, UA Negative <2.0 EU/dL    Leukocytes, UA 3+ (A) Negative   TSH    Collection Time: 12/23/17  2:46 PM   Result Value Ref Range    TSH 0.517 0.400 - 4.000 uIU/mL   Urinalysis Microscopic    Collection Time: 12/23/17  2:46 PM   Result Value Ref Range    RBC, UA 2 0 - 4 /hpf    WBC, UA 78 (H) 0 - 5 /hpf    WBC Clumps, UA MOD None-Rare    Bacteria, UA Many (A) None-Occ /hpf    Hyaline Casts, UA 16 (A) 0-1/lpf /lpf    Microscopic Comment SEE COMMENT          Significant Imaging: I have reviewed all pertinent imaging results/findings within the past 24 hours.    Assessment/Plan:     * SIRS (systemic inflammatory response syndrome)    Patient does not meet criteria for sepsis at this time.  SIRS 1/4 for elevated WBC count with likely source of infection being from the urinary tract.  -Patient received dose of Rocephin  -Fluid resuscitation   -Follow up urine cultures  -Follow up blood cultures  -daily CBC          Acute cystitis without hematuria    Urinalysis positive with 78 WBC and many bacteria.  -Urine culture obtained will follow up  -Given 1g Rocephin in the ED, will continue and tailor antibiotics according to culture result          Acute kidney injury superimposed on chronic kidney disease    Patient with history of chronic kidney disease with a baseline ~1.1.  Per nursing home records and patients daughter she has had poor PO intake especially over the last week.  UA consistent with dehydration with 16 hyaline cast.  Another contributing factor could be her urinary tract infection.  -Will hydrate patient   -Urine electrolytes          Heart failure    Patients family reports recent  diagnosis of heart failure.  No recent echocardiogram in records  -Will hold lasix and ACEi in setting of LUCIEN  -Resume beta blocker when able          Hypercalcemia    Calcium 11.5 that corrects to 13.1  -IVF  -regular BMP  -Will hold lasix for now given LUCIEN          Hypernatremia    Sodium 152 upon admission.  Likely etiologies include impaired access to free water given her mental status and the continuation of lasix.  -Rehydration with D5 @75cc/hr  -BMP q 8 to assess for the rate of correction, correction goal of 8-10 over next 24hours given acute hypernatremia          Alzheimer's dementia    Normal home medications including donepezil and memantine   -Will need SLP swallow evaluation  -Resume medications when patient safely tolerating oral intake          Hypercholesteremia              Coronary artery disease    Continue aspirin 81mg          Hypertension    Continue amlodipine when able to take PO intake          Sick sinus syndrome    S/p pace maker placement            VTE Risk Mitigation         Ordered     Medium Risk of VTE  Once      12/23/17 1657     Place sequential compression device  Until discontinued      12/23/17 1657             Eduardo Fitzgerald MD  Department of Hospital Medicine   [facility]

## 2017-12-24 NOTE — SUBJECTIVE & OBJECTIVE
Interval History: No acute events overnight.  Patients sodium continues to be 152 this AM.  Still non verbal.      Review of Systems   Unable to perform ROS: Patient nonverbal     Objective:     Vital Signs (Most Recent):  Temp: 96.8 °F (36 °C) (12/24/17 0746)  Pulse: 60 (12/24/17 0831)  Resp: 17 (12/24/17 0746)  BP: 121/67 (12/24/17 0746)  SpO2: (!) 93 % (12/24/17 0746) Vital Signs (24h Range):  Temp:  [96.8 °F (36 °C)-99.2 °F (37.3 °C)] 96.8 °F (36 °C)  Pulse:  [60-65] 60  Resp:  [17-20] 17  SpO2:  [86 %-100 %] 93 %  BP: (103-132)/(57-67) 121/67        There is no height or weight on file to calculate BMI.    Intake/Output Summary (Last 24 hours) at 12/24/17 0934  Last data filed at 12/24/17 0400   Gross per 24 hour   Intake              500 ml   Output                0 ml   Net              500 ml      Physical Exam   Constitutional:   86 year old female appearing stated age, nonverbal, does not appear to be in acute distress   HENT:   Head: Normocephalic and atraumatic.   Eyes: EOM are normal. Pupils are equal, round, and reactive to light. No scleral icterus.   Neck: Normal range of motion. Neck supple. No JVD present. No tracheal deviation present.   Cardiovascular: Normal rate, regular rhythm and normal heart sounds.  Exam reveals no gallop and no friction rub.    No murmur heard.  Pulmonary/Chest: Effort normal and breath sounds normal. No stridor. No respiratory distress. She has no wheezes. She has no rales.   Abdominal: Soft. She exhibits no distension and no mass. There is no guarding.   Musculoskeletal: She exhibits no edema or deformity.   Neurological:   Patient non-verbal   Skin: Skin is warm and dry.       Significant Labs:   Recent Results (from the past 24 hour(s))   Blood Culture #1 **CANNOT BE ORDERED STAT**    Collection Time: 12/23/17  2:45 PM   Result Value Ref Range    Blood Culture, Routine No Growth to date    CBC auto differential    Collection Time: 12/23/17  2:46 PM   Result Value Ref  Range    WBC 13.11 (H) 3.90 - 12.70 K/uL    RBC 4.09 4.00 - 5.40 M/uL    Hemoglobin 12.5 12.0 - 16.0 g/dL    Hematocrit 39.5 37.0 - 48.5 %    MCV 97 82 - 98 fL    MCH 30.6 27.0 - 31.0 pg    MCHC 31.6 (L) 32.0 - 36.0 g/dL    RDW 15.7 (H) 11.5 - 14.5 %    Platelets 144 (L) 150 - 350 K/uL    MPV 10.9 9.2 - 12.9 fL    Immature Granulocytes 0.7 (H) 0.0 - 0.5 %    Gran # 10.6 (H) 1.8 - 7.7 K/uL    Immature Grans (Abs) 0.09 (H) 0.00 - 0.04 K/uL    Lymph # 1.4 1.0 - 4.8 K/uL    Mono # 0.9 0.3 - 1.0 K/uL    Eos # 0.0 0.0 - 0.5 K/uL    Baso # 0.03 0.00 - 0.20 K/uL    nRBC 0 0 /100 WBC    Gran% 81.1 (H) 38.0 - 73.0 %    Lymph% 10.6 (L) 18.0 - 48.0 %    Mono% 7.1 4.0 - 15.0 %    Eosinophil% 0.3 0.0 - 8.0 %    Basophil% 0.2 0.0 - 1.9 %    Differential Method Automated    Comprehensive metabolic panel    Collection Time: 12/23/17  2:46 PM   Result Value Ref Range    Sodium 152 (H) 136 - 145 mmol/L    Potassium 4.1 3.5 - 5.1 mmol/L    Chloride 113 (H) 95 - 110 mmol/L    CO2 29 23 - 29 mmol/L    Glucose 151 (H) 70 - 110 mg/dL    BUN, Bld 46 (H) 8 - 23 mg/dL    Creatinine 2.2 (H) 0.5 - 1.4 mg/dL    Calcium 11.5 (H) 8.7 - 10.5 mg/dL    Total Protein 7.2 6.0 - 8.4 g/dL    Albumin 2.0 (L) 3.5 - 5.2 g/dL    Total Bilirubin 1.4 (H) 0.1 - 1.0 mg/dL    Alkaline Phosphatase 104 55 - 135 U/L    AST 17 10 - 40 U/L    ALT 8 (L) 10 - 44 U/L    Anion Gap 10 8 - 16 mmol/L    eGFR if African American 22.7 (A) >60 mL/min/1.73 m^2    eGFR if non  19.7 (A) >60 mL/min/1.73 m^2   Troponin I    Collection Time: 12/23/17  2:46 PM   Result Value Ref Range    Troponin I 0.267 (H) 0.000 - 0.026 ng/mL   Brain natriuretic peptide    Collection Time: 12/23/17  2:46 PM   Result Value Ref Range     (H) 0 - 99 pg/mL   Urinalysis, Reflex to Urine Culture Urine, Catheterized    Collection Time: 12/23/17  2:46 PM   Result Value Ref Range    Specimen UA Urine, Catheterized     Color, UA Yellow Yellow, Straw, Kathryn    Appearance, UA Cloudy (A)  Clear    pH, UA 7.0 5.0 - 8.0    Specific Gravity, UA 1.010 1.005 - 1.030    Protein, UA 1+ (A) Negative    Glucose, UA Negative Negative    Ketones, UA Negative Negative    Bilirubin (UA) Negative Negative    Occult Blood UA 1+ (A) Negative    Nitrite, UA Negative Negative    Urobilinogen, UA Negative <2.0 EU/dL    Leukocytes, UA 3+ (A) Negative   TSH    Collection Time: 12/23/17  2:46 PM   Result Value Ref Range    TSH 0.517 0.400 - 4.000 uIU/mL   Urinalysis Microscopic    Collection Time: 12/23/17  2:46 PM   Result Value Ref Range    RBC, UA 2 0 - 4 /hpf    WBC, UA 78 (H) 0 - 5 /hpf    WBC Clumps, UA MOD None-Rare    Bacteria, UA Many (A) None-Occ /hpf    Hyaline Casts, UA 16 (A) 0-1/lpf /lpf    Microscopic Comment SEE COMMENT    Blood culture    Collection Time: 12/23/17  6:05 PM   Result Value Ref Range    Blood Culture, Routine No Growth to date    PTH, intact    Collection Time: 12/23/17  7:17 PM   Result Value Ref Range    PTH, Intact 31.0 9.0 - 77.0 pg/mL   Vitamin D    Collection Time: 12/23/17  7:17 PM   Result Value Ref Range    Vit D, 25-Hydroxy 35 30 - 96 ng/mL   Magnesium    Collection Time: 12/24/17  5:09 AM   Result Value Ref Range    Magnesium 2.4 1.6 - 2.6 mg/dL   Phosphorus    Collection Time: 12/24/17  5:09 AM   Result Value Ref Range    Phosphorus 3.5 2.7 - 4.5 mg/dL   CBC auto differential    Collection Time: 12/24/17  5:09 AM   Result Value Ref Range    WBC 13.04 (H) 3.90 - 12.70 K/uL    RBC 3.74 (L) 4.00 - 5.40 M/uL    Hemoglobin 11.6 (L) 12.0 - 16.0 g/dL    Hematocrit 36.1 (L) 37.0 - 48.5 %    MCV 97 82 - 98 fL    MCH 31.0 27.0 - 31.0 pg    MCHC 32.1 32.0 - 36.0 g/dL    RDW 15.8 (H) 11.5 - 14.5 %    Platelets 133 (L) 150 - 350 K/uL    MPV 10.9 9.2 - 12.9 fL    Immature Granulocytes 0.7 (H) 0.0 - 0.5 %    Gran # 10.3 (H) 1.8 - 7.7 K/uL    Immature Grans (Abs) 0.09 (H) 0.00 - 0.04 K/uL    Lymph # 1.4 1.0 - 4.8 K/uL    Mono # 1.1 (H) 0.3 - 1.0 K/uL    Eos # 0.1 0.0 - 0.5 K/uL    Baso # 0.03  0.00 - 0.20 K/uL    nRBC 0 0 /100 WBC    Gran% 79.1 (H) 38.0 - 73.0 %    Lymph% 10.9 (L) 18.0 - 48.0 %    Mono% 8.6 4.0 - 15.0 %    Eosinophil% 0.5 0.0 - 8.0 %    Basophil% 0.2 0.0 - 1.9 %    Differential Method Automated    Basic metabolic panel    Collection Time: 12/24/17  7:09 AM   Result Value Ref Range    Sodium 152 (H) 136 - 145 mmol/L    Potassium 3.7 3.5 - 5.1 mmol/L    Chloride 114 (H) 95 - 110 mmol/L    CO2 29 23 - 29 mmol/L    Glucose 126 (H) 70 - 110 mg/dL    BUN, Bld 45 (H) 8 - 23 mg/dL    Creatinine 2.0 (H) 0.5 - 1.4 mg/dL    Calcium 10.9 (H) 8.7 - 10.5 mg/dL    Anion Gap 9 8 - 16 mmol/L    eGFR if African American 25.5 (A) >60 mL/min/1.73 m^2    eGFR if non  22.1 (A) >60 mL/min/1.73 m^2         Significant Imaging: I have reviewed all pertinent imaging results/findings within the past 24 hours.

## 2017-12-24 NOTE — PLAN OF CARE
Problem: SLP Goal  Goal: SLP Goal  Speech Language Pathology Goals  Goals expected to be met by 12/31  1. Pt will participate in ongoing swallow assessment to determine least restrictive diet.        Swallow evaluation completed with initiated POC.    Mayra Truong M.A. CCC-SLP  Speech Language Pathologist  [phone]  12/24/2017

## 2017-12-24 NOTE — ASSESSMENT & PLAN NOTE
Patient does not meet criteria for sepsis at this time.  SIRS 1/4 for elevated WBC count with likely source of infection being from the urinary tract.  -Patient received dose of Rocephin  -Fluid resuscitation   -Follow up urine cultures  -Follow up blood cultures  -daily CBC

## 2017-12-24 NOTE — PT/OT/SLP EVAL
Speech Language Pathology Evaluation  Bedside Swallow    Patient Name:  Margarita Castrejon   MRN:  [id number]  Admitting Diagnosis: SIRS (systemic inflammatory response syndrome)    Recommendations:                 General Recommendations:  Dysphagia therapy  Diet recommendations:  NPO, NPO   Aspiration Precautions: Frequent oral care and Strict aspiration precautions   General Precautions: Standard, aspiration, fall  Communication strategies:  yes/no questions only, provide increased time to answer and go to room if call light pushed    History:     Past Medical History:   Diagnosis Date    Alzheimer disease     Coronary artery disease 6/28/2013 1993. CABG x 3. EJ.    CVA (cerebrovascular accident) 6/28/2013 2009.    Dementia     Depression     GERD (gastroesophageal reflux disease)     Heart failure, diastolic, acute on chronic 9/19/2016    820/2016: CXR: Mild CHF. Minimal effusions.    HTN (hypertension)     Hypercholesteremia 6/28/2013    Hypertension, benign 6/28/2013    Diagnosed 1990.    Osteoporosis, unspecified     Pacemaker 6/28/2013    10/22/2012: [facility]DR PPM. Merlin not on because she is at NH.    Renal disorder     SSS (sick sinus syndrome) 6/28/2013    10/22/2012: [facility] PPM.    Supraventricular tachycardia 11/18/2014       Past Surgical History:   Procedure Laterality Date    HERNIA REPAIR      kidney surgery           Prior Intubation HX:  None this visit    Modified Barium Swallow: None this visit    Chest X-Rays: 12/23: Focal opacification at the right lung base, most likely due to combination of a small amount of pleural fluid and atelectasis.  Allowing for significant rotation towards the right, there are probably no other interval changes since 6/24/14.    Prior diet: Seen 7/26/17 by SLP: rec'd puree food and honey thick liquid. Per daughter, pt now eating puree food and thin liquids with assistance.     Per family, pt talking at BL.      Subjective     Pt lethargy;  non-vebal  Patient goals: none stated  Family goals: for pt to eat     Objective:     Oral Musculature Evaluation  · Oral Musculature: unable to assess due to poor participation/comprehension  · Dentition: edentulous  · Voice Prior to PO Intake: no phonation to assess    Bedside Swallow Eval:   Consistencies Assessed:  · Thin liquids single ice chip x2     Oral Phase:   · poor oral acceptance; fair bolus manipulation; significant oral holding that benefited somewhat from max cues to initiate pharyngeal swallow    Pharyngeal Phase:   · wet breathing after swallow  · wet vocal quality after swallow   · Throat clearing    Compensatory Strategies  · limited PO trials attempted 2/2 high aspiration risk    RN in room aware of diet recs. Skilled education provided on aspiration precautions and diet recommendations to pt and family. POC dw and agreed upon with family. Encouraged family not to force feed pt until passed assessment by SLP; oral care encouraged and swabs provided to BS. Whiteboard updated with diet recommendations/aspiration precautions.  No further questions.      Assessment:     Margarita Castrejon is a 86 y.o. female with an SLP diagnosis of oropharyngeal dysphagia.     Goals:    SLP Goals        Problem: SLP Goal    Goal Priority Disciplines Outcome   SLP Goal     SLP    Description:  Speech Language Pathology Goals  Goals expected to be met by 12/31  1. Pt will participate in ongoing swallow assessment to determine least restrictive diet.                        Plan:     · Patient to be seen:  5 x/week   · Plan of Care expires:  01/22/18  · Plan of Care reviewed with:  patient, family   · SLP Follow-Up:  Yes       Discharge recommendations:   pending progress   Barriers to Discharge:  Level of Skilled Assistance Needed   and Safety Awareness      Time Tracking:     SLP Treatment Date:   12/24/17  Speech Start Time:  1056  Speech Stop Time:  1108     Speech Total Time (min):  12 min    Billable Minutes: Eval  Swallow and Oral Function 12        Mayra Truong M.A. CCC-SLP  Speech Language Pathologist  [phone]  12/24/2017

## 2017-12-24 NOTE — ASSESSMENT & PLAN NOTE
Sodium 152 upon admission.  Likely etiologies include impaired access to free water given her mental status and the continuation of lasix.  -Rehydration with D5 @75cc/hr  -BMP q 8 to assess for the rate of correction, correction goal of 8-10 over next 24hours given acute hypernatremia

## 2017-12-24 NOTE — SUBJECTIVE & OBJECTIVE
Past Medical History:   Diagnosis Date    Alzheimer disease     Coronary artery disease 6/28/2013 1993. CABG x 3. EJ.    CVA (cerebrovascular accident) 6/28/2013 2009.    Dementia     Depression     GERD (gastroesophageal reflux disease)     Heart failure, diastolic, acute on chronic 9/19/2016    820/2016: CXR: Mild CHF. Minimal effusions.    HTN (hypertension)     Hypercholesteremia 6/28/2013    Hypertension, benign 6/28/2013    Diagnosed 1990.    Osteoporosis, unspecified     Pacemaker 6/28/2013    10/22/2012: [facility] PPM. Merlin not on because she is at NH.    Renal disorder     SSS (sick sinus syndrome) 6/28/2013    10/22/2012: [facility].    Supraventricular tachycardia 11/18/2014       Past Surgical History:   Procedure Laterality Date    HERNIA REPAIR      kidney surgery         Review of patient's allergies indicates:   Allergen Reactions    Heparin analogues Shortness Of Breath    Phenytoin sodium extended Rash       No current facility-administered medications on file prior to encounter.      Current Outpatient Prescriptions on File Prior to Encounter   Medication Sig    acetaminophen (TYLENOL) 325 MG tablet Take 325 mg by mouth every 6 (six) hours as needed for Pain.    alendronate (FOSAMAX) 70 MG tablet Take 1 tablet (70 mg total) by mouth every 7 days. 1 Tablet Oral Weekly    amlodipine (NORVASC) 5 MG tablet Take 1 tablet (5 mg total) by mouth once daily.    aspirin (ECOTRIN) 81 MG EC tablet Take 81 mg by mouth. 1 Tablet, Delayed Release (E.C.) Oral Every day.  available over the counter    CALCIUM CARBONATE/VITAMIN D3 (CALCIUM 600 + D,3, ORAL) Take by mouth 2 (two) times daily.    donepezil (ARICEPT) 10 MG tablet Take 1 tablet (10 mg total) by mouth once daily. 1 Tablet Oral Every day    escitalopram oxalate (LEXAPRO) 10 MG tablet Take 1 tablet (10 mg total) by mouth once daily.    furosemide (LASIX) 20 MG tablet Take 20 mg by mouth once daily.      guaifenesin (MUCINEX) 600 mg 12 hr tablet Take 1,200 mg by mouth 2 (two) times daily.    levalbuterol (XOPENEX) 0.63 mg/3 mL nebulizer solution Take 3 mLs (0.63 mg total) by nebulization 2 (two) times daily as needed for Shortness of Breath.    lisinopril (PRINIVIL,ZESTRIL) 5 MG tablet Take 1 tablet (5 mg total) by mouth once daily.    memantine (NAMENDA) 10 MG Tab Take 1 tablet (10 mg total) by mouth 2 (two) times daily.    metoprolol succinate (TOPROL-XL) 50 MG 24 hr tablet Take 1 tablet (50 mg total) by mouth once daily.    nitroGLYCERIN (NITROSTAT) 0.4 MG SL tablet 1 tab every 5 minutes as needed. maximum 3 tabs in 15 mins.    potassium chloride SA (K-DUR,KLOR-CON) 20 MEQ tablet Take 1 tablet (20 mEq total) by mouth once daily.    trazodone (DESYREL) 50 MG tablet Take 1 tablet (50 mg total) by mouth every evening.    polyethylene glycol (MIRALAX) 17 gram PwPk Take by mouth.     Family History     None        Social History Main Topics    Smoking status: Former Smoker     Packs/day: 1.00     Years: 40.00     Types: Cigarettes     Start date: 1/22/1969     Quit date: 1/1/2009    Smokeless tobacco: Never Used    Alcohol use No    Drug use: No    Sexual activity: Not Currently     Review of Systems   Unable to perform ROS: Patient nonverbal     Objective:     Vital Signs (Most Recent):  Temp: 98.9 °F (37.2 °C) (12/23/17 1801)  Pulse: 63 (12/23/17 1801)  Resp: 20 (12/23/17 1801)  BP: 132/64 (12/23/17 1801)  SpO2: 98 % (12/23/17 1801) Vital Signs (24h Range):  Temp:  [97.4 °F (36.3 °C)-99.2 °F (37.3 °C)] 98.9 °F (37.2 °C)  Pulse:  [60-65] 63  Resp:  [20] 20  SpO2:  [98 %-100 %] 98 %  BP: (103-132)/(60-67) 132/64        There is no height or weight on file to calculate BMI.    Physical Exam   Constitutional:   86 year old female appearing stated age, nonverbal, does not appear to be in acute distress   HENT:   Head: Normocephalic and atraumatic.   Eyes: EOM are normal. Pupils are equal, round, and reactive  to light. No scleral icterus.   Neck: Normal range of motion. Neck supple. No JVD present. No tracheal deviation present.   Cardiovascular: Normal rate, regular rhythm and normal heart sounds.  Exam reveals no gallop and no friction rub.    No murmur heard.  Pulmonary/Chest: Effort normal and breath sounds normal. No stridor. No respiratory distress. She has no wheezes. She has no rales.   Abdominal: Soft. She exhibits no distension and no mass. There is no guarding.   Musculoskeletal: She exhibits no edema or deformity.   Neurological:   Patient non-verbal   Skin: Skin is warm and dry.         CRANIAL NERVES     CN III, IV, VI   Pupils are equal, round, and reactive to light.  Extraocular motions are normal.        Significant Labs:   Recent Results (from the past 24 hour(s))   CBC auto differential    Collection Time: 12/23/17  2:46 PM   Result Value Ref Range    WBC 13.11 (H) 3.90 - 12.70 K/uL    RBC 4.09 4.00 - 5.40 M/uL    Hemoglobin 12.5 12.0 - 16.0 g/dL    Hematocrit 39.5 37.0 - 48.5 %    MCV 97 82 - 98 fL    MCH 30.6 27.0 - 31.0 pg    MCHC 31.6 (L) 32.0 - 36.0 g/dL    RDW 15.7 (H) 11.5 - 14.5 %    Platelets 144 (L) 150 - 350 K/uL    MPV 10.9 9.2 - 12.9 fL    Immature Granulocytes 0.7 (H) 0.0 - 0.5 %    Gran # 10.6 (H) 1.8 - 7.7 K/uL    Immature Grans (Abs) 0.09 (H) 0.00 - 0.04 K/uL    Lymph # 1.4 1.0 - 4.8 K/uL    Mono # 0.9 0.3 - 1.0 K/uL    Eos # 0.0 0.0 - 0.5 K/uL    Baso # 0.03 0.00 - 0.20 K/uL    nRBC 0 0 /100 WBC    Gran% 81.1 (H) 38.0 - 73.0 %    Lymph% 10.6 (L) 18.0 - 48.0 %    Mono% 7.1 4.0 - 15.0 %    Eosinophil% 0.3 0.0 - 8.0 %    Basophil% 0.2 0.0 - 1.9 %    Differential Method Automated    Comprehensive metabolic panel    Collection Time: 12/23/17  2:46 PM   Result Value Ref Range    Sodium 152 (H) 136 - 145 mmol/L    Potassium 4.1 3.5 - 5.1 mmol/L    Chloride 113 (H) 95 - 110 mmol/L    CO2 29 23 - 29 mmol/L    Glucose 151 (H) 70 - 110 mg/dL    BUN, Bld 46 (H) 8 - 23 mg/dL    Creatinine 2.2 (H)  0.5 - 1.4 mg/dL    Calcium 11.5 (H) 8.7 - 10.5 mg/dL    Total Protein 7.2 6.0 - 8.4 g/dL    Albumin 2.0 (L) 3.5 - 5.2 g/dL    Total Bilirubin 1.4 (H) 0.1 - 1.0 mg/dL    Alkaline Phosphatase 104 55 - 135 U/L    AST 17 10 - 40 U/L    ALT 8 (L) 10 - 44 U/L    Anion Gap 10 8 - 16 mmol/L    eGFR if African American 22.7 (A) >60 mL/min/1.73 m^2    eGFR if non  19.7 (A) >60 mL/min/1.73 m^2   Troponin I    Collection Time: 12/23/17  2:46 PM   Result Value Ref Range    Troponin I 0.267 (H) 0.000 - 0.026 ng/mL   Brain natriuretic peptide    Collection Time: 12/23/17  2:46 PM   Result Value Ref Range     (H) 0 - 99 pg/mL   Urinalysis, Reflex to Urine Culture Urine, Catheterized    Collection Time: 12/23/17  2:46 PM   Result Value Ref Range    Specimen UA Urine, Catheterized     Color, UA Yellow Yellow, Straw, Kathryn    Appearance, UA Cloudy (A) Clear    pH, UA 7.0 5.0 - 8.0    Specific Gravity, UA 1.010 1.005 - 1.030    Protein, UA 1+ (A) Negative    Glucose, UA Negative Negative    Ketones, UA Negative Negative    Bilirubin (UA) Negative Negative    Occult Blood UA 1+ (A) Negative    Nitrite, UA Negative Negative    Urobilinogen, UA Negative <2.0 EU/dL    Leukocytes, UA 3+ (A) Negative   TSH    Collection Time: 12/23/17  2:46 PM   Result Value Ref Range    TSH 0.517 0.400 - 4.000 uIU/mL   Urinalysis Microscopic    Collection Time: 12/23/17  2:46 PM   Result Value Ref Range    RBC, UA 2 0 - 4 /hpf    WBC, UA 78 (H) 0 - 5 /hpf    WBC Clumps, UA MOD None-Rare    Bacteria, UA Many (A) None-Occ /hpf    Hyaline Casts, UA 16 (A) 0-1/lpf /lpf    Microscopic Comment SEE COMMENT          Significant Imaging: I have reviewed all pertinent imaging results/findings within the past 24 hours.

## 2017-12-24 NOTE — PROGRESS NOTES
[facility] Hospital Medicine  Progress Note    Patient Name: Margarita Castrejon  MRN: [id number]  Patient Class: IP- Inpatient   Admission Date: 12/23/2017  Length of Stay: 1 days  Attending Physician: Jasson Lawrence MD  Primary Care Provider: Patrick Landa MD    Hospital Medicine Team: [facility] HOSP MED 2 Eduardo Fitzgerald MD    Subjective:     Principal Problem:SIRS (systemic inflammatory response syndrome)    HPI:  Margarita Castrejon is a 86 y.o. with Alzheimers, CAD, CVA, hypertension, hyperlipidemia, CHF and SVT who presented to the [facility] emergency department from the nursing home where she resides with altered mental status.  Patient is currently non-verbal and the history was obtained from her daughter who brought her to the ED.  Her daughter reports that at her baseline she is able to stand with a walker/ aids, assist with dressing by aids, and is normally able to communicate verbally.  She also reports that she was hospitalized at [facility] for urosepsis.      Hospital Course:  Margarita Castrejon was admitted to internal medicine on 12/23/2017.  Patient was found to be hypernatremic and she was started on D5.  She was continued on Rocephin for presumed UTI.      Interval History: No acute events overnight.  Patients sodium continues to be 152 this AM.  Still non verbal.      Review of Systems   Unable to perform ROS: Patient nonverbal     Objective:     Vital Signs (Most Recent):  Temp: 96.8 °F (36 °C) (12/24/17 0746)  Pulse: 60 (12/24/17 0831)  Resp: 17 (12/24/17 0746)  BP: 121/67 (12/24/17 0746)  SpO2: (!) 93 % (12/24/17 0746) Vital Signs (24h Range):  Temp:  [96.8 °F (36 °C)-99.2 °F (37.3 °C)] 96.8 °F (36 °C)  Pulse:  [60-65] 60  Resp:  [17-20] 17  SpO2:  [86 %-100 %] 93 %  BP: (103-132)/(57-67) 121/67        There is no height or weight on file to calculate BMI.    Intake/Output Summary (Last 24 hours) at 12/24/17 0982  Last data filed at 12/24/17 0400   Gross per 24 hour   Intake               500 ml   Output                0 ml   Net              500 ml      Physical Exam   Constitutional:   86 year old female appearing stated age, nonverbal, does not appear to be in acute distress   HENT:   Head: Normocephalic and atraumatic.   Eyes: EOM are normal. Pupils are equal, round, and reactive to light. No scleral icterus.   Neck: Normal range of motion. Neck supple. No JVD present. No tracheal deviation present.   Cardiovascular: Normal rate, regular rhythm and normal heart sounds.  Exam reveals no gallop and no friction rub.    No murmur heard.  Pulmonary/Chest: Effort normal and breath sounds normal. No stridor. No respiratory distress. She has no wheezes. She has no rales.   Abdominal: Soft. She exhibits no distension and no mass. There is no guarding.   Musculoskeletal: She exhibits no edema or deformity.   Neurological:   Patient non-verbal   Skin: Skin is warm and dry.       Significant Labs:   Recent Results (from the past 24 hour(s))   Blood Culture #1 **CANNOT BE ORDERED STAT**    Collection Time: 12/23/17  2:45 PM   Result Value Ref Range    Blood Culture, Routine No Growth to date    CBC auto differential    Collection Time: 12/23/17  2:46 PM   Result Value Ref Range    WBC 13.11 (H) 3.90 - 12.70 K/uL    RBC 4.09 4.00 - 5.40 M/uL    Hemoglobin 12.5 12.0 - 16.0 g/dL    Hematocrit 39.5 37.0 - 48.5 %    MCV 97 82 - 98 fL    MCH 30.6 27.0 - 31.0 pg    MCHC 31.6 (L) 32.0 - 36.0 g/dL    RDW 15.7 (H) 11.5 - 14.5 %    Platelets 144 (L) 150 - 350 K/uL    MPV 10.9 9.2 - 12.9 fL    Immature Granulocytes 0.7 (H) 0.0 - 0.5 %    Gran # 10.6 (H) 1.8 - 7.7 K/uL    Immature Grans (Abs) 0.09 (H) 0.00 - 0.04 K/uL    Lymph # 1.4 1.0 - 4.8 K/uL    Mono # 0.9 0.3 - 1.0 K/uL    Eos # 0.0 0.0 - 0.5 K/uL    Baso # 0.03 0.00 - 0.20 K/uL    nRBC 0 0 /100 WBC    Gran% 81.1 (H) 38.0 - 73.0 %    Lymph% 10.6 (L) 18.0 - 48.0 %    Mono% 7.1 4.0 - 15.0 %    Eosinophil% 0.3 0.0 - 8.0 %    Basophil% 0.2 0.0 - 1.9 %    Differential  Method Automated    Comprehensive metabolic panel    Collection Time: 12/23/17  2:46 PM   Result Value Ref Range    Sodium 152 (H) 136 - 145 mmol/L    Potassium 4.1 3.5 - 5.1 mmol/L    Chloride 113 (H) 95 - 110 mmol/L    CO2 29 23 - 29 mmol/L    Glucose 151 (H) 70 - 110 mg/dL    BUN, Bld 46 (H) 8 - 23 mg/dL    Creatinine 2.2 (H) 0.5 - 1.4 mg/dL    Calcium 11.5 (H) 8.7 - 10.5 mg/dL    Total Protein 7.2 6.0 - 8.4 g/dL    Albumin 2.0 (L) 3.5 - 5.2 g/dL    Total Bilirubin 1.4 (H) 0.1 - 1.0 mg/dL    Alkaline Phosphatase 104 55 - 135 U/L    AST 17 10 - 40 U/L    ALT 8 (L) 10 - 44 U/L    Anion Gap 10 8 - 16 mmol/L    eGFR if African American 22.7 (A) >60 mL/min/1.73 m^2    eGFR if non  19.7 (A) >60 mL/min/1.73 m^2   Troponin I    Collection Time: 12/23/17  2:46 PM   Result Value Ref Range    Troponin I 0.267 (H) 0.000 - 0.026 ng/mL   Brain natriuretic peptide    Collection Time: 12/23/17  2:46 PM   Result Value Ref Range     (H) 0 - 99 pg/mL   Urinalysis, Reflex to Urine Culture Urine, Catheterized    Collection Time: 12/23/17  2:46 PM   Result Value Ref Range    Specimen UA Urine, Catheterized     Color, UA Yellow Yellow, Straw, Kathryn    Appearance, UA Cloudy (A) Clear    pH, UA 7.0 5.0 - 8.0    Specific Gravity, UA 1.010 1.005 - 1.030    Protein, UA 1+ (A) Negative    Glucose, UA Negative Negative    Ketones, UA Negative Negative    Bilirubin (UA) Negative Negative    Occult Blood UA 1+ (A) Negative    Nitrite, UA Negative Negative    Urobilinogen, UA Negative <2.0 EU/dL    Leukocytes, UA 3+ (A) Negative   TSH    Collection Time: 12/23/17  2:46 PM   Result Value Ref Range    TSH 0.517 0.400 - 4.000 uIU/mL   Urinalysis Microscopic    Collection Time: 12/23/17  2:46 PM   Result Value Ref Range    RBC, UA 2 0 - 4 /hpf    WBC, UA 78 (H) 0 - 5 /hpf    WBC Clumps, UA MOD None-Rare    Bacteria, UA Many (A) None-Occ /hpf    Hyaline Casts, UA 16 (A) 0-1/lpf /lpf    Microscopic Comment SEE COMMENT    Blood  culture    Collection Time: 12/23/17  6:05 PM   Result Value Ref Range    Blood Culture, Routine No Growth to date    PTH, intact    Collection Time: 12/23/17  7:17 PM   Result Value Ref Range    PTH, Intact 31.0 9.0 - 77.0 pg/mL   Vitamin D    Collection Time: 12/23/17  7:17 PM   Result Value Ref Range    Vit D, 25-Hydroxy 35 30 - 96 ng/mL   Magnesium    Collection Time: 12/24/17  5:09 AM   Result Value Ref Range    Magnesium 2.4 1.6 - 2.6 mg/dL   Phosphorus    Collection Time: 12/24/17  5:09 AM   Result Value Ref Range    Phosphorus 3.5 2.7 - 4.5 mg/dL   CBC auto differential    Collection Time: 12/24/17  5:09 AM   Result Value Ref Range    WBC 13.04 (H) 3.90 - 12.70 K/uL    RBC 3.74 (L) 4.00 - 5.40 M/uL    Hemoglobin 11.6 (L) 12.0 - 16.0 g/dL    Hematocrit 36.1 (L) 37.0 - 48.5 %    MCV 97 82 - 98 fL    MCH 31.0 27.0 - 31.0 pg    MCHC 32.1 32.0 - 36.0 g/dL    RDW 15.8 (H) 11.5 - 14.5 %    Platelets 133 (L) 150 - 350 K/uL    MPV 10.9 9.2 - 12.9 fL    Immature Granulocytes 0.7 (H) 0.0 - 0.5 %    Gran # 10.3 (H) 1.8 - 7.7 K/uL    Immature Grans (Abs) 0.09 (H) 0.00 - 0.04 K/uL    Lymph # 1.4 1.0 - 4.8 K/uL    Mono # 1.1 (H) 0.3 - 1.0 K/uL    Eos # 0.1 0.0 - 0.5 K/uL    Baso # 0.03 0.00 - 0.20 K/uL    nRBC 0 0 /100 WBC    Gran% 79.1 (H) 38.0 - 73.0 %    Lymph% 10.9 (L) 18.0 - 48.0 %    Mono% 8.6 4.0 - 15.0 %    Eosinophil% 0.5 0.0 - 8.0 %    Basophil% 0.2 0.0 - 1.9 %    Differential Method Automated    Basic metabolic panel    Collection Time: 12/24/17  7:09 AM   Result Value Ref Range    Sodium 152 (H) 136 - 145 mmol/L    Potassium 3.7 3.5 - 5.1 mmol/L    Chloride 114 (H) 95 - 110 mmol/L    CO2 29 23 - 29 mmol/L    Glucose 126 (H) 70 - 110 mg/dL    BUN, Bld 45 (H) 8 - 23 mg/dL    Creatinine 2.0 (H) 0.5 - 1.4 mg/dL    Calcium 10.9 (H) 8.7 - 10.5 mg/dL    Anion Gap 9 8 - 16 mmol/L    eGFR if African American 25.5 (A) >60 mL/min/1.73 m^2    eGFR if non  22.1 (A) >60 mL/min/1.73 m^2         Significant  Imaging: I have reviewed all pertinent imaging results/findings within the past 24 hours.    Assessment/Plan:      * SIRS (systemic inflammatory response syndrome)    Patient does not meet criteria for sepsis at this time.  SIRS 1/4 for elevated WBC count with likely source of infection being from the urinary tract.  -Patient received dose of Rocephin  -Fluid resuscitation   -Follow up urine cultures  -Follow up blood cultures  -daily CBC          Acute cystitis without hematuria    Urinalysis positive with 78 WBC and many bacteria.  -Urine culture obtained will follow up  -Given 1g Rocephin in the ED, will continue and tailor antibiotics according to culture result          Acute kidney injury superimposed on chronic kidney disease    Patient with history of chronic kidney disease with a baseline ~1.1.  Per nursing home records and patients daughter she has had poor PO intake especially over the last week.  UA consistent with dehydration with 16 hyaline cast.  Another contributing factor could be her urinary tract infection.  -Will hydrate patient   -Urine electrolytes          Acute encephalopathy    Patient reported to be interactive and verbal at baseline.  Over last week has become non-verbal.    -Etiology likely infection (UTI)  -Hypernatremia is also probably contributing   -WIll continue to treat UTI and hypernatremia to assess for improvement  -Though unlikely condition could represent progression of dementia          Heart failure    Patients family reports recent diagnosis of heart failure.  No recent echocardiogram in records  -Will hold lasix and ACEi in setting of LUCIEN  -Resume beta blocker when able          Hypercalcemia    Calcium 11.5 that corrects to 13.1 on admission 10.9 today  -IVF  -regular BMP  -Will hold lasix for now given LUCIEN          Hypernatremia    Sodium 152 upon admission.  Likely etiologies include impaired access to free water given her mental status and the continuation of  lasix.  -Rehydration with D5 @75cc/hr with no improvement, will increase rate of fluid  -BMP q 8 to assess for the rate of correction, correction goal of 8-10 over next 24hours given acute hypernatremia          Alzheimer's dementia    Normal home medications including donepezil and memantine   -Will need SLP swallow evaluation  -Resume medications when patient safely tolerating oral intake          Hypercholesteremia              Coronary artery disease    Continue aspirin 81mg          Hypertension    Continue amlodipine when able to take PO intake          Sick sinus syndrome    S/p pace maker placement            VTE Risk Mitigation         Ordered     High Risk of VTE  Once      12/23/17 2058     Place sequential compression device  Until discontinued      12/23/17 1657              Eduardo Fitzgerald MD  Department of Hospital Medicine   [facility]

## 2017-12-24 NOTE — HOSPITAL COURSE
Margarita Castrejon was admitted to internal medicine on 12/23/2017.  Patient was found to be hypernatremic and she was started on D5.  She was continued on Rocephin for presumed UTI.  Blood cx growing gram Neg rods. Na normalized at 144 and D5W was discontinued. However, Na trending up and pt NPO , so started on D5/.45NS 75cc/hr Palliative consulted for assistance with family goals of care discussion.

## 2017-12-24 NOTE — PLAN OF CARE
Problem: Fall Risk (Adult)  Goal: Identify Related Risk Factors and Signs and Symptoms  Related risk factors and signs and symptoms are identified upon initiation of Human Response Clinical Practice Guideline (CPG)   Outcome: Ongoing (interventions implemented as appropriate)   12/24/17 3711   Fall Risk   Related Risk Factors (Fall Risk) age-related changes;confusion/agitation;gait/mobility problems;history of falls;environment unfamiliar

## 2017-12-24 NOTE — ASSESSMENT & PLAN NOTE
Patient with history of chronic kidney disease with a baseline ~1.1.  Per nursing home records and patients daughter she has had poor PO intake especially over the last week.  UA consistent with dehydration with 16 hyaline cast.  Another contributing factor could be her urinary tract infection.  -Will hydrate patient   -Urine electrolytes

## 2017-12-24 NOTE — ED NOTES
Pt resting on stretcher in no acute distress. Pt on tele box and continuous pulse oximetry and automatic BP cuff. Family member at bedside. Side rails up X2. Bed low and locked. Pt and family updated on plan of care, waiting on transport to move to room.

## 2017-12-24 NOTE — ASSESSMENT & PLAN NOTE
Calcium 11.5 that corrects to 13.1 on admission 10.9 today  -IVF  -regular BMP  -Will hold lasix for now given LUCIEN

## 2017-12-24 NOTE — ASSESSMENT & PLAN NOTE
Sodium 152 upon admission.  Likely etiologies include impaired access to free water given her mental status and the continuation of lasix.  -Rehydration with D5 @75cc/hr with no improvement, will increase rate of fluid  -BMP q 8 to assess for the rate of correction, correction goal of 8-10 over next 24hours given acute hypernatremia

## 2017-12-24 NOTE — ASSESSMENT & PLAN NOTE
Patient reported to be interactive and verbal at baseline.  Over last week has become non-verbal.    -Etiology likely infection (UTI)  -Hypernatremia is also probably contributing   -WIll continue to treat UTI and hypernatremia to assess for improvement  -Though unlikely condition could represent progression of dementia

## 2017-12-24 NOTE — ED NOTES
Pt resting with eyes closed, resp even/non-labored, opened eye to voice, does not answer questions asked.  Cardiac monitor placed for transport to floor.

## 2017-12-25 PROBLEM — R78.81 GRAM-NEGATIVE BACTEREMIA: Status: ACTIVE | Noted: 2017-12-25

## 2017-12-25 LAB
ANION GAP SERPL CALC-SCNC: 6 MMOL/L
ANION GAP SERPL CALC-SCNC: 7 MMOL/L
ANION GAP SERPL CALC-SCNC: 9 MMOL/L
BASOPHILS # BLD AUTO: 0.03 K/UL
BASOPHILS NFR BLD: 0.3 %
BUN SERPL-MCNC: 43 MG/DL
BUN SERPL-MCNC: 44 MG/DL
BUN SERPL-MCNC: 44 MG/DL
CALCIUM SERPL-MCNC: 10 MG/DL
CALCIUM SERPL-MCNC: 10.1 MG/DL
CALCIUM SERPL-MCNC: 10.3 MG/DL
CHLORIDE SERPL-SCNC: 110 MMOL/L
CHLORIDE SERPL-SCNC: 110 MMOL/L
CHLORIDE SERPL-SCNC: 112 MMOL/L
CO2 SERPL-SCNC: 25 MMOL/L
CO2 SERPL-SCNC: 27 MMOL/L
CO2 SERPL-SCNC: 28 MMOL/L
CREAT SERPL-MCNC: 1.6 MG/DL
DIFFERENTIAL METHOD: ABNORMAL
EOSINOPHIL # BLD AUTO: 0.2 K/UL
EOSINOPHIL NFR BLD: 1.8 %
ERYTHROCYTE [DISTWIDTH] IN BLOOD BY AUTOMATED COUNT: 15.5 %
EST. GFR  (AFRICAN AMERICAN): 33.4 ML/MIN/1.73 M^2
EST. GFR  (NON AFRICAN AMERICAN): 29 ML/MIN/1.73 M^2
GLUCOSE SERPL-MCNC: 104 MG/DL
GLUCOSE SERPL-MCNC: 112 MG/DL
GLUCOSE SERPL-MCNC: 85 MG/DL
HCT VFR BLD AUTO: 35.2 %
HGB BLD-MCNC: 11.3 G/DL
IMM GRANULOCYTES # BLD AUTO: 0.13 K/UL
IMM GRANULOCYTES NFR BLD AUTO: 1.2 %
LYMPHOCYTES # BLD AUTO: 1.6 K/UL
LYMPHOCYTES NFR BLD: 14.5 %
MAGNESIUM SERPL-MCNC: 2.3 MG/DL
MCH RBC QN AUTO: 31.3 PG
MCHC RBC AUTO-ENTMCNC: 32.1 G/DL
MCV RBC AUTO: 98 FL
MONOCYTES # BLD AUTO: 0.9 K/UL
MONOCYTES NFR BLD: 8.6 %
NEUTROPHILS # BLD AUTO: 8.1 K/UL
NEUTROPHILS NFR BLD: 73.6 %
NRBC BLD-RTO: 0 /100 WBC
PHOSPHATE SERPL-MCNC: 2.4 MG/DL
PLATELET # BLD AUTO: 113 K/UL
PMV BLD AUTO: 11 FL
POTASSIUM SERPL-SCNC: 3.4 MMOL/L
POTASSIUM SERPL-SCNC: 3.6 MMOL/L
POTASSIUM SERPL-SCNC: 4 MMOL/L
RBC # BLD AUTO: 3.61 M/UL
SODIUM SERPL-SCNC: 144 MMOL/L
SODIUM SERPL-SCNC: 144 MMOL/L
SODIUM SERPL-SCNC: 146 MMOL/L
WBC # BLD AUTO: 10.94 K/UL

## 2017-12-25 PROCEDURE — 99232 SBSQ HOSP IP/OBS MODERATE 35: CPT | Mod: GC,,, | Performed by: HOSPITALIST

## 2017-12-25 PROCEDURE — S5010 5% DEXTROSE AND 0.45% SALINE: HCPCS | Performed by: INTERNAL MEDICINE

## 2017-12-25 PROCEDURE — 85025 COMPLETE CBC W/AUTO DIFF WBC: CPT

## 2017-12-25 PROCEDURE — 83735 ASSAY OF MAGNESIUM: CPT

## 2017-12-25 PROCEDURE — 25000003 PHARM REV CODE 250: Performed by: SURGERY

## 2017-12-25 PROCEDURE — 11000001 HC ACUTE MED/SURG PRIVATE ROOM

## 2017-12-25 PROCEDURE — 63600175 PHARM REV CODE 636 W HCPCS: Performed by: INTERNAL MEDICINE

## 2017-12-25 PROCEDURE — 36415 COLL VENOUS BLD VENIPUNCTURE: CPT

## 2017-12-25 PROCEDURE — 25000003 PHARM REV CODE 250: Performed by: INTERNAL MEDICINE

## 2017-12-25 PROCEDURE — 84100 ASSAY OF PHOSPHORUS: CPT

## 2017-12-25 PROCEDURE — 80048 BASIC METABOLIC PNL TOTAL CA: CPT | Mod: 91

## 2017-12-25 RX ORDER — DEXTROSE MONOHYDRATE AND SODIUM CHLORIDE 5; .45 G/100ML; G/100ML
INJECTION, SOLUTION INTRAVENOUS CONTINUOUS
Status: DISCONTINUED | OUTPATIENT
Start: 2017-12-25 | End: 2017-12-28

## 2017-12-25 RX ADMIN — DEXTROSE MONOHYDRATE: 5 INJECTION, SOLUTION INTRAVENOUS at 12:12

## 2017-12-25 RX ADMIN — DEXTROSE MONOHYDRATE AND SODIUM CHLORIDE: 5; .45 INJECTION, SOLUTION INTRAVENOUS at 11:12

## 2017-12-25 RX ADMIN — CEFTRIAXONE SODIUM 2 G: 2 INJECTION, POWDER, FOR SOLUTION INTRAMUSCULAR; INTRAVENOUS at 11:12

## 2017-12-25 NOTE — SUBJECTIVE & OBJECTIVE
Interval History: No acute events overnight. Na 144-->146 so restarted IVFs. Pt nonverbal on exam.   Review of Systems   Unable to perform ROS: Patient nonverbal     Objective:     Vital Signs (Most Recent):  Temp: 97.4 °F (36.3 °C) (12/25/17 1138)  Pulse: 65 (12/25/17 1140)  Resp: 16 (12/25/17 1138)  BP: (!) 114/59 (12/25/17 1138)  SpO2: 95 % (12/25/17 1138) Vital Signs (24h Range):  Temp:  [97 °F (36.1 °C)-97.4 °F (36.3 °C)] 97.4 °F (36.3 °C)  Pulse:  [60-71] 65  Resp:  [16] 16  SpO2:  [91 %-95 %] 95 %  BP: (104-115)/(55-77) 114/59        There is no height or weight on file to calculate BMI.    Intake/Output Summary (Last 24 hours) at 12/25/17 1343  Last data filed at 12/25/17 0600   Gross per 24 hour   Intake              600 ml   Output                0 ml   Net              600 ml      Physical Exam   Constitutional:   86 year old female appearing stated age, nonverbal, does not appear to be in acute distress   HENT:   Head: Normocephalic and atraumatic.   Eyes: EOM are normal. Pupils are equal, round, and reactive to light. No scleral icterus.   Neck: Normal range of motion. Neck supple. No JVD present. No tracheal deviation present.   Cardiovascular: Normal rate, regular rhythm and normal heart sounds.  Exam reveals no gallop and no friction rub.    No murmur heard.  Pulmonary/Chest: Effort normal and breath sounds normal. No stridor. No respiratory distress. She has no wheezes. She has no rales.   Abdominal: Soft. She exhibits no distension and no mass. There is no guarding.   Musculoskeletal: She exhibits no edema or deformity.   Neurological:   Patient non-verbal. Opens eyes to verbal and tactile stimulation. Does not follow commands.   Skin: Skin is warm and dry.       Significant Labs:   Recent Results (from the past 24 hour(s))   Blood culture    Collection Time: 12/24/17  2:22 PM   Result Value Ref Range    Blood Culture, Routine No Growth to date    Blood culture    Collection Time: 12/24/17  2:22 PM    Result Value Ref Range    Blood Culture, Routine No Growth to date    Basic metabolic panel    Collection Time: 12/24/17  2:22 PM   Result Value Ref Range    Sodium 150 (H) 136 - 145 mmol/L    Potassium 3.8 3.5 - 5.1 mmol/L    Chloride 113 (H) 95 - 110 mmol/L    CO2 29 23 - 29 mmol/L    Glucose 119 (H) 70 - 110 mg/dL    BUN, Bld 45 (H) 8 - 23 mg/dL    Creatinine 1.9 (H) 0.5 - 1.4 mg/dL    Calcium 10.8 (H) 8.7 - 10.5 mg/dL    Anion Gap 8 8 - 16 mmol/L    eGFR if African American 27.1 (A) >60 mL/min/1.73 m^2    eGFR if non  23.5 (A) >60 mL/min/1.73 m^2   Creatinine, urine, random    Collection Time: 12/24/17  4:46 PM   Result Value Ref Range    Creatinine, Random Ur 78.0 15.0 - 325.0 mg/dL   Urea nitrogen, urine    Collection Time: 12/24/17  4:46 PM   Result Value Ref Range    Urine Urea Nitrogen, Random 592 140 - 1050 mg/dL   Basic metabolic panel    Collection Time: 12/24/17 11:58 PM   Result Value Ref Range    Sodium 144 136 - 145 mmol/L    Potassium 3.4 (L) 3.5 - 5.1 mmol/L    Chloride 110 95 - 110 mmol/L    CO2 28 23 - 29 mmol/L    Glucose 112 (H) 70 - 110 mg/dL    BUN, Bld 44 (H) 8 - 23 mg/dL    Creatinine 1.6 (H) 0.5 - 1.4 mg/dL    Calcium 10.0 8.7 - 10.5 mg/dL    Anion Gap 6 (L) 8 - 16 mmol/L    eGFR if African American 33.4 (A) >60 mL/min/1.73 m^2    eGFR if non  29.0 (A) >60 mL/min/1.73 m^2   Magnesium    Collection Time: 12/25/17  4:15 AM   Result Value Ref Range    Magnesium 2.3 1.6 - 2.6 mg/dL   Phosphorus    Collection Time: 12/25/17  4:15 AM   Result Value Ref Range    Phosphorus 2.4 (L) 2.7 - 4.5 mg/dL   CBC auto differential    Collection Time: 12/25/17  4:15 AM   Result Value Ref Range    WBC 10.94 3.90 - 12.70 K/uL    RBC 3.61 (L) 4.00 - 5.40 M/uL    Hemoglobin 11.3 (L) 12.0 - 16.0 g/dL    Hematocrit 35.2 (L) 37.0 - 48.5 %    MCV 98 82 - 98 fL    MCH 31.3 (H) 27.0 - 31.0 pg    MCHC 32.1 32.0 - 36.0 g/dL    RDW 15.5 (H) 11.5 - 14.5 %    Platelets 113 (L) 150 - 350  K/uL    MPV 11.0 9.2 - 12.9 fL    Immature Granulocytes 1.2 (H) 0.0 - 0.5 %    Gran # 8.1 (H) 1.8 - 7.7 K/uL    Immature Grans (Abs) 0.13 (H) 0.00 - 0.04 K/uL    Lymph # 1.6 1.0 - 4.8 K/uL    Mono # 0.9 0.3 - 1.0 K/uL    Eos # 0.2 0.0 - 0.5 K/uL    Baso # 0.03 0.00 - 0.20 K/uL    nRBC 0 0 /100 WBC    Gran% 73.6 (H) 38.0 - 73.0 %    Lymph% 14.5 (L) 18.0 - 48.0 %    Mono% 8.6 4.0 - 15.0 %    Eosinophil% 1.8 0.0 - 8.0 %    Basophil% 0.3 0.0 - 1.9 %    Differential Method Automated    Basic metabolic panel    Collection Time: 12/25/17  8:31 AM   Result Value Ref Range    Sodium 146 (H) 136 - 145 mmol/L    Potassium 3.6 3.5 - 5.1 mmol/L    Chloride 110 95 - 110 mmol/L    CO2 27 23 - 29 mmol/L    Glucose 85 70 - 110 mg/dL    BUN, Bld 43 (H) 8 - 23 mg/dL    Creatinine 1.6 (H) 0.5 - 1.4 mg/dL    Calcium 10.3 8.7 - 10.5 mg/dL    Anion Gap 9 8 - 16 mmol/L    eGFR if African American 33.4 (A) >60 mL/min/1.73 m^2    eGFR if non  29.0 (A) >60 mL/min/1.73 m^2         Significant Imaging: I have reviewed all pertinent imaging results/findings within the past 24 hours.

## 2017-12-25 NOTE — ASSESSMENT & PLAN NOTE
Calcium 11.5 that corrects to 13.1 on admission; improving  -IVF  -regular BMP  -Will hold lasix for now given LUCIEN

## 2017-12-25 NOTE — ASSESSMENT & PLAN NOTE
-suspect 2/2 UTI  -UCx- in process  -awaiting blood cx speciation and susceptibilities  -blood cx 12/24-NGTD  -continue IV rocephin

## 2017-12-25 NOTE — ASSESSMENT & PLAN NOTE
Sodium 152 upon admission.  Likely etiologies include impaired access to free water given her mental status and the continuation of lasix.  -Rehydration with D5 @75cc/hr with no improvement, will increase rate of fluid  -BMP q 8 to assess for the rate of correction, correction goal of 8-10 over next 24hours given acute hypernatremia  -Na 146 this AM.   -continue to monitor

## 2017-12-25 NOTE — ASSESSMENT & PLAN NOTE
Urinalysis positive with 78 WBC and many bacteria.  -Urine culture- results pending  -Blood cx- gram (-) rods; awaiting speciation and susceptibilities  -Blood cx 12/24: NGTD  -continue rocephin 2g IV q 24h

## 2017-12-25 NOTE — ASSESSMENT & PLAN NOTE
Normal home medications including donepezil and memantine   -Per ST pt to remain NPO  -Resume medications when patient safely tolerating oral intake  - will discuss NG tube placement with family

## 2017-12-25 NOTE — ASSESSMENT & PLAN NOTE
Patient does not meet criteria for sepsis at this time.  SIRS 1/4 for elevated WBC count with likely source of infection being from the urinary tract.  --as under UTI

## 2017-12-25 NOTE — ASSESSMENT & PLAN NOTE
Patients family reports recent diagnosis of heart failure.  No recent echocardiogram in records  -Will continue to hold lasix and ACEi in setting of LUCIEN  -Resume beta blocker when able

## 2017-12-25 NOTE — PLAN OF CARE
Problem: Pressure Ulcer Risk (Ney Scale) (Adult,Obstetrics,Pediatric)  Goal: Skin Integrity  Patient will demonstrate the desired outcomes by discharge/transition of care.   Outcome: Ongoing (interventions implemented as appropriate)   12/24/17 6603   Pressure Ulcer Risk (Ney Scale) (Adult,Obstetrics,Pediatric)   Skin Integrity making progress toward outcome

## 2017-12-25 NOTE — PROGRESS NOTES
[facility] Hospital Medicine  Progress Note    Patient Name: Margarita Castrejon  MRN: [id number]  Patient Class: IP- Inpatient   Admission Date: 12/23/2017  Length of Stay: 2 days  Attending Physician: Jasson Lawrence MD  Primary Care Provider: Patrick Landa MD    Hospital Medicine Team: [facility] MED 2 Shanel Haywood MD    Subjective:     Principal Problem:SIRS (systemic inflammatory response syndrome)    HPI:  Margarita Castrejon is a 86 y.o. with Alzheimers, CAD, CVA, hypertension, hyperlipidemia, CHF and SVT who presented to the [facility] emergency department from the nursing home where she resides with altered mental status.  Patient is currently non-verbal and the history was obtained from her daughter who brought her to the ED.  Her daughter reports that at her baseline she is able to stand with a walker/ aids, assist with dressing by aids, and is normally able to communicate verbally.  She also reports that she was hospitalized at [facility] for urosepsis.      Hospital Course:  Margarita Castrejon was admitted to internal medicine on 12/23/2017.  Patient was found to be hypernatremic and she was started on D5.  She was continued on Rocephin for presumed UTI.  Blood cx growing gram Neg rods. Na normalized at 144 and D5W was discontinued. However, Na trending up and pt NPO , so started on D5/.45NS 75cc/hr. Palliative consulted for assistance with family goals of care discussion.     Interval History: No acute events overnight. Na 144-->146 so restarted IVFs. Pt nonverbal on exam.   Review of Systems   Unable to perform ROS: Patient nonverbal     Objective:     Vital Signs (Most Recent):  Temp: 97.4 °F (36.3 °C) (12/25/17 1138)  Pulse: 65 (12/25/17 1140)  Resp: 16 (12/25/17 1138)  BP: (!) 114/59 (12/25/17 1138)  SpO2: 95 % (12/25/17 1138) Vital Signs (24h Range):  Temp:  [97 °F (36.1 °C)-97.4 °F (36.3 °C)] 97.4 °F (36.3 °C)  Pulse:  [60-71] 65  Resp:  [16] 16  SpO2:  [91 %-95 %] 95 %  BP:  (104-115)/(55-77) 114/59        There is no height or weight on file to calculate BMI.    Intake/Output Summary (Last 24 hours) at 12/25/17 1343  Last data filed at 12/25/17 0600   Gross per 24 hour   Intake              600 ml   Output                0 ml   Net              600 ml      Physical Exam   Constitutional:   86 year old female appearing stated age, nonverbal, does not appear to be in acute distress   HENT:   Head: Normocephalic and atraumatic.   Eyes: EOM are normal. Pupils are equal, round, and reactive to light. No scleral icterus.   Neck: Normal range of motion. Neck supple. No JVD present. No tracheal deviation present.   Cardiovascular: Normal rate, regular rhythm and normal heart sounds.  Exam reveals no gallop and no friction rub.    No murmur heard.  Pulmonary/Chest: Effort normal and breath sounds normal. No stridor. No respiratory distress. She has no wheezes. She has no rales.   Abdominal: Soft. She exhibits no distension and no mass. There is no guarding.   Musculoskeletal: She exhibits no edema or deformity.   Neurological:   Patient non-verbal. Opens eyes to verbal and tactile stimulation. Does not follow commands.   Skin: Skin is warm and dry.       Significant Labs:   Recent Results (from the past 24 hour(s))   Blood culture    Collection Time: 12/24/17  2:22 PM   Result Value Ref Range    Blood Culture, Routine No Growth to date    Blood culture    Collection Time: 12/24/17  2:22 PM   Result Value Ref Range    Blood Culture, Routine No Growth to date    Basic metabolic panel    Collection Time: 12/24/17  2:22 PM   Result Value Ref Range    Sodium 150 (H) 136 - 145 mmol/L    Potassium 3.8 3.5 - 5.1 mmol/L    Chloride 113 (H) 95 - 110 mmol/L    CO2 29 23 - 29 mmol/L    Glucose 119 (H) 70 - 110 mg/dL    BUN, Bld 45 (H) 8 - 23 mg/dL    Creatinine 1.9 (H) 0.5 - 1.4 mg/dL    Calcium 10.8 (H) 8.7 - 10.5 mg/dL    Anion Gap 8 8 - 16 mmol/L    eGFR if African American 27.1 (A) >60 mL/min/1.73 m^2     eGFR if non  23.5 (A) >60 mL/min/1.73 m^2   Creatinine, urine, random    Collection Time: 12/24/17  4:46 PM   Result Value Ref Range    Creatinine, Random Ur 78.0 15.0 - 325.0 mg/dL   Urea nitrogen, urine    Collection Time: 12/24/17  4:46 PM   Result Value Ref Range    Urine Urea Nitrogen, Random 592 140 - 1050 mg/dL   Basic metabolic panel    Collection Time: 12/24/17 11:58 PM   Result Value Ref Range    Sodium 144 136 - 145 mmol/L    Potassium 3.4 (L) 3.5 - 5.1 mmol/L    Chloride 110 95 - 110 mmol/L    CO2 28 23 - 29 mmol/L    Glucose 112 (H) 70 - 110 mg/dL    BUN, Bld 44 (H) 8 - 23 mg/dL    Creatinine 1.6 (H) 0.5 - 1.4 mg/dL    Calcium 10.0 8.7 - 10.5 mg/dL    Anion Gap 6 (L) 8 - 16 mmol/L    eGFR if African American 33.4 (A) >60 mL/min/1.73 m^2    eGFR if non  29.0 (A) >60 mL/min/1.73 m^2   Magnesium    Collection Time: 12/25/17  4:15 AM   Result Value Ref Range    Magnesium 2.3 1.6 - 2.6 mg/dL   Phosphorus    Collection Time: 12/25/17  4:15 AM   Result Value Ref Range    Phosphorus 2.4 (L) 2.7 - 4.5 mg/dL   CBC auto differential    Collection Time: 12/25/17  4:15 AM   Result Value Ref Range    WBC 10.94 3.90 - 12.70 K/uL    RBC 3.61 (L) 4.00 - 5.40 M/uL    Hemoglobin 11.3 (L) 12.0 - 16.0 g/dL    Hematocrit 35.2 (L) 37.0 - 48.5 %    MCV 98 82 - 98 fL    MCH 31.3 (H) 27.0 - 31.0 pg    MCHC 32.1 32.0 - 36.0 g/dL    RDW 15.5 (H) 11.5 - 14.5 %    Platelets 113 (L) 150 - 350 K/uL    MPV 11.0 9.2 - 12.9 fL    Immature Granulocytes 1.2 (H) 0.0 - 0.5 %    Gran # 8.1 (H) 1.8 - 7.7 K/uL    Immature Grans (Abs) 0.13 (H) 0.00 - 0.04 K/uL    Lymph # 1.6 1.0 - 4.8 K/uL    Mono # 0.9 0.3 - 1.0 K/uL    Eos # 0.2 0.0 - 0.5 K/uL    Baso # 0.03 0.00 - 0.20 K/uL    nRBC 0 0 /100 WBC    Gran% 73.6 (H) 38.0 - 73.0 %    Lymph% 14.5 (L) 18.0 - 48.0 %    Mono% 8.6 4.0 - 15.0 %    Eosinophil% 1.8 0.0 - 8.0 %    Basophil% 0.3 0.0 - 1.9 %    Differential Method Automated    Basic metabolic panel     Collection Time: 12/25/17  8:31 AM   Result Value Ref Range    Sodium 146 (H) 136 - 145 mmol/L    Potassium 3.6 3.5 - 5.1 mmol/L    Chloride 110 95 - 110 mmol/L    CO2 27 23 - 29 mmol/L    Glucose 85 70 - 110 mg/dL    BUN, Bld 43 (H) 8 - 23 mg/dL    Creatinine 1.6 (H) 0.5 - 1.4 mg/dL    Calcium 10.3 8.7 - 10.5 mg/dL    Anion Gap 9 8 - 16 mmol/L    eGFR if African American 33.4 (A) >60 mL/min/1.73 m^2    eGFR if non  29.0 (A) >60 mL/min/1.73 m^2         Significant Imaging: I have reviewed all pertinent imaging results/findings within the past 24 hours.    Assessment/Plan:      * SIRS (systemic inflammatory response syndrome)    Patient does not meet criteria for sepsis at this time.  SIRS 1/4 for elevated WBC count with likely source of infection being from the urinary tract.  --as under UTI          Gram-negative bacteremia    -suspect 2/2 UTI  -UCx- in process  -awaiting blood cx speciation and susceptibilities  -blood cx 12/24-NGTD  -continue IV rocephin           Acute encephalopathy    Patient reported to be interactive and verbal at baseline.  Over last week has become non-verbal.    -Etiology likely infection (UTI) and gram negative bacteremia  -Hypernatremia and hypercalcemia also probably contributing   -WIll continue to treat UTI and hypernatremia to assess for improvement  -Though unlikely condition could represent progression of dementia  -palliative consulted for assistance with goals of care discussion          Heart failure    Patients family reports recent diagnosis of heart failure.  No recent echocardiogram in records  -Will continue to hold lasix and ACEi in setting of LUCIEN  -Resume beta blocker when able           Hypercalcemia    Calcium 11.5 that corrects to 13.1 on admission; improving  -IVF  -regular BMP  -Will hold lasix for now given LUCIEN          Hypernatremia    Sodium 152 upon admission.  Likely etiologies include impaired access to free water given her mental status and  the continuation of lasix.  -Rehydration with D5 @75cc/hr with no improvement, will increase rate of fluid  -BMP q 8 to assess for the rate of correction, correction goal of 8-10 over next 24hours given acute hypernatremia  -Na 146 this AM.   -continue to monitor          Acute kidney injury superimposed on chronic kidney disease    Patient with history of chronic kidney disease with a baseline ~1.1.  Per nursing home records and patients daughter she has had poor PO intake especially over the last week.  UA consistent with dehydration with 16 hyaline cast.  Another contributing factor could be her urinary tract infection.  -improving with IVFs  -avoid nephrotoxic agents          Acute cystitis without hematuria    Urinalysis positive with 78 WBC and many bacteria.  -Urine culture- results pending  -Blood cx- gram (-) rods; awaiting speciation and susceptibilities  -Blood cx 12/24: NGTD  -continue rocephin 2g IV q 24h          Alzheimer's dementia    Normal home medications including donepezil and memantine   -Per ST pt to remain NPO  -Resume medications when patient safely tolerating oral intake  - will discuss NG tube placement with family        Hypercholesteremia              Coronary artery disease    Holding  aspirin 81mg while NPO          Hypertension    Continue amlodipine when able to take PO intake  Currently BP at goal        Sick sinus syndrome    S/p pace maker placement            VTE Risk Mitigation         Ordered     High Risk of VTE  Once      12/23/17 2058     Place sequential compression device  Until discontinued      12/23/17 1657        Dispo: likely back to NH vs hospice pending clinical improvement and palliative care      Shanel Haywood MD  Department of Hospital Medicine   [facility]

## 2017-12-25 NOTE — ASSESSMENT & PLAN NOTE
Patient reported to be interactive and verbal at baseline.  Over last week has become non-verbal.    -Etiology likely infection (UTI) and gram negative bacteremia  -Hypernatremia and hypercalcemia also probably contributing   -WIll continue to treat UTI and hypernatremia to assess for improvement  -Though unlikely condition could represent progression of dementia  -palliative consulted for assistance with goals of care discussion

## 2017-12-26 PROBLEM — Z51.5 PALLIATIVE CARE ENCOUNTER: Status: ACTIVE | Noted: 2017-12-26

## 2017-12-26 LAB
ANION GAP SERPL CALC-SCNC: 11 MMOL/L
ANION GAP SERPL CALC-SCNC: 5 MMOL/L
ANION GAP SERPL CALC-SCNC: 8 MMOL/L
BACTERIA BLD CULT: NORMAL
BACTERIA UR CULT: NORMAL
BACTERIA UR CULT: NORMAL
BASOPHILS # BLD AUTO: 0.03 K/UL
BASOPHILS NFR BLD: 0.3 %
BUN SERPL-MCNC: 40 MG/DL
BUN SERPL-MCNC: 42 MG/DL
BUN SERPL-MCNC: 43 MG/DL
CALCIUM SERPL-MCNC: 10.3 MG/DL
CALCIUM SERPL-MCNC: 9.6 MG/DL
CALCIUM SERPL-MCNC: 9.7 MG/DL
CHLORIDE SERPL-SCNC: 109 MMOL/L
CHLORIDE SERPL-SCNC: 110 MMOL/L
CHLORIDE SERPL-SCNC: 111 MMOL/L
CO2 SERPL-SCNC: 26 MMOL/L
CO2 SERPL-SCNC: 27 MMOL/L
CO2 SERPL-SCNC: 30 MMOL/L
CREAT SERPL-MCNC: 1.4 MG/DL
CREAT SERPL-MCNC: 1.5 MG/DL
CREAT SERPL-MCNC: 1.6 MG/DL
DIFFERENTIAL METHOD: ABNORMAL
EOSINOPHIL # BLD AUTO: 0.1 K/UL
EOSINOPHIL NFR BLD: 1.4 %
ERYTHROCYTE [DISTWIDTH] IN BLOOD BY AUTOMATED COUNT: 15.8 %
EST. GFR  (AFRICAN AMERICAN): 33.4 ML/MIN/1.73 M^2
EST. GFR  (AFRICAN AMERICAN): 36.1 ML/MIN/1.73 M^2
EST. GFR  (AFRICAN AMERICAN): 39.2 ML/MIN/1.73 M^2
EST. GFR  (NON AFRICAN AMERICAN): 29 ML/MIN/1.73 M^2
EST. GFR  (NON AFRICAN AMERICAN): 31.3 ML/MIN/1.73 M^2
EST. GFR  (NON AFRICAN AMERICAN): 34 ML/MIN/1.73 M^2
GLUCOSE SERPL-MCNC: 101 MG/DL
GLUCOSE SERPL-MCNC: 105 MG/DL
GLUCOSE SERPL-MCNC: 94 MG/DL
HCT VFR BLD AUTO: 36 %
HGB BLD-MCNC: 11.6 G/DL
IMM GRANULOCYTES # BLD AUTO: 0.13 K/UL
IMM GRANULOCYTES NFR BLD AUTO: 1.4 %
LYMPHOCYTES # BLD AUTO: 1.7 K/UL
LYMPHOCYTES NFR BLD: 17.6 %
MAGNESIUM SERPL-MCNC: 2.4 MG/DL
MCH RBC QN AUTO: 31 PG
MCHC RBC AUTO-ENTMCNC: 32.2 G/DL
MCV RBC AUTO: 96 FL
MONOCYTES # BLD AUTO: 0.8 K/UL
MONOCYTES NFR BLD: 8.4 %
NEUTROPHILS # BLD AUTO: 6.8 K/UL
NEUTROPHILS NFR BLD: 70.9 %
NRBC BLD-RTO: 0 /100 WBC
PHOSPHATE SERPL-MCNC: 2.2 MG/DL
PLATELET # BLD AUTO: 107 K/UL
PMV BLD AUTO: 11.2 FL
POTASSIUM SERPL-SCNC: 3.2 MMOL/L
POTASSIUM SERPL-SCNC: 3.5 MMOL/L
POTASSIUM SERPL-SCNC: 3.7 MMOL/L
RBC # BLD AUTO: 3.74 M/UL
SODIUM SERPL-SCNC: 145 MMOL/L
SODIUM SERPL-SCNC: 146 MMOL/L
SODIUM SERPL-SCNC: 146 MMOL/L
WBC # BLD AUTO: 9.61 K/UL

## 2017-12-26 PROCEDURE — S5010 5% DEXTROSE AND 0.45% SALINE: HCPCS | Performed by: INTERNAL MEDICINE

## 2017-12-26 PROCEDURE — 99232 SBSQ HOSP IP/OBS MODERATE 35: CPT | Mod: GC,,, | Performed by: HOSPITALIST

## 2017-12-26 PROCEDURE — 63600175 PHARM REV CODE 636 W HCPCS: Performed by: INTERNAL MEDICINE

## 2017-12-26 PROCEDURE — 11000001 HC ACUTE MED/SURG PRIVATE ROOM

## 2017-12-26 PROCEDURE — 92526 ORAL FUNCTION THERAPY: CPT

## 2017-12-26 PROCEDURE — 83735 ASSAY OF MAGNESIUM: CPT

## 2017-12-26 PROCEDURE — 99223 1ST HOSP IP/OBS HIGH 75: CPT | Mod: ,,, | Performed by: CLINICAL NURSE SPECIALIST

## 2017-12-26 PROCEDURE — 85025 COMPLETE CBC W/AUTO DIFF WBC: CPT

## 2017-12-26 PROCEDURE — 80048 BASIC METABOLIC PNL TOTAL CA: CPT

## 2017-12-26 PROCEDURE — 36415 COLL VENOUS BLD VENIPUNCTURE: CPT

## 2017-12-26 PROCEDURE — 63600175 PHARM REV CODE 636 W HCPCS: Performed by: SURGERY

## 2017-12-26 PROCEDURE — 80048 BASIC METABOLIC PNL TOTAL CA: CPT | Mod: 91

## 2017-12-26 PROCEDURE — 84100 ASSAY OF PHOSPHORUS: CPT

## 2017-12-26 PROCEDURE — 25000003 PHARM REV CODE 250: Performed by: INTERNAL MEDICINE

## 2017-12-26 RX ORDER — POTASSIUM CHLORIDE 7.45 MG/ML
10 INJECTION INTRAVENOUS ONCE
Status: COMPLETED | OUTPATIENT
Start: 2017-12-26 | End: 2017-12-26

## 2017-12-26 RX ADMIN — POTASSIUM CHLORIDE 10 MEQ: 10 INJECTION, SOLUTION INTRAVENOUS at 03:12

## 2017-12-26 RX ADMIN — CEFTRIAXONE SODIUM 2 G: 2 INJECTION, POWDER, FOR SOLUTION INTRAMUSCULAR; INTRAVENOUS at 12:12

## 2017-12-26 RX ADMIN — DEXTROSE MONOHYDRATE AND SODIUM CHLORIDE: 5; .45 INJECTION, SOLUTION INTRAVENOUS at 11:12

## 2017-12-26 NOTE — PLAN OF CARE
"Palliative Care Social Work   Assessment  Name: Margarita Castrejon  MRN: [id number]  Date of Birth/Age:  1931  Sex: female  Ethnicity:     Primary Language:English   Needed: no    Attending Physician: Dr. Helms  Reason for Referral: assistance with clarification of goals of care  Consult Order Date:  17 1016  Primary CM/SW:Seth Lieberman    Palliative Care Provider: ANA Llamas    Present during Interview: pt asleep, pt's dtr Maggy and granddaughter Radha, pal care APRN and this SW, For beginning oldest dtr Megan was on phone.     Past & Current Medical Situation:   Diagnosis: SIRS (systemic inflammatory response syndrome)  PMH:   Past Medical History:   Diagnosis Date    Alzheimer disease     Coronary artery disease 2013. CABG x 3. EJ.    CVA (cerebrovascular accident) 2013.    Dementia     Depression     GERD (gastroesophageal reflux disease)     Heart failure, diastolic, acute on chronic 2016    820/2016: CXR: Mild CHF. Minimal effusions.    HTN (hypertension)     Hypercholesteremia 2013    Hypertension, benign 2013    Diagnosed .    Osteoporosis, unspecified     Pacemaker 2013    10/22/2012: [facility]. Merlin not on because she is at NH.    Renal disorder     SSS (sick sinus syndrome) 2013    10/22/2012: [facility].    Supraventricular tachycardia 2014     Mental Health/Substance Use History: depression  Non-traditional Health practices: n/a    Understanding of diagnosis and prognosis: Maggy has good understanding of dx and px. Will benefit from continued support regarding px.     Patients Mental Status: Confused. Asleep most of visit. Would open eyes, look around and close eyes again.     Socio-Economic Factors/Resources:  Address: [facility] [address] [place] [address]  Phone Number: [phone] (home)     Marital Status: .  Dtr stated that ""  in  after being " "together for 30 plus years. Dtr stated that pt and  "" never legally .   Household Composition: [facility] for past 3 years. First placed in  but lived with some of her children off and on for some time.   Children: 7 children: Megan, Shashi, Abel, Shashi, Anthony, Tio and Maggy.   Relationships with Family: Large family. Pt has 7 children, 10 plus grandchildren and at least 5 great grand children. Main support is daughter Maggy. Dtr and Granddaughter visit pt every other day in the nursing home. Dtr Megan stated that pt has a "dysfunctional family". When asked to clarify, dtr Maggy stated that she communicates with all of her siblings and they have no problems between them. Dtr Maggy did not expand any further.     Dtr stated that pt's 7 children have at least 3 different fathers.     Emergency Contacts:   Dtr: Maggy Rosenberg: [phone]  Dtr: Shashi Hendrix: [phone]; [phone]    Activities of Daily Living: Needs Assitance with ADLs  Support Systems-Family & Community (Home Health, HME etc): [facility]. [facility] in the distant past.     Transportation:  yes    Work/Education History: Pt worked as a  for [employer] on [employer].    History: no    Financial Resources:Medicare. Medicaid      Advanced Care Planning & Legal Concerns:   Advanced Directives/Living Will: [employer] in the chart   Planning:  no    Power of : no  Surrogate Decision Maker: 7 children make decisions as a whole.       Spirituality, Culture & Coping Mechanisms:  F- Erna and Belief: Hinduism   I - Importance: Dtr stated that pt is not practicing.   C - Community/Culture Values:   A - Address in Care: No needs at this time.       Strengths/Coping Strategies: Family Supportive  Self-Care Activities/Hobbies: Pt enjoys spending time with her family per dtr Maggy.    Goals/Hopes/Expectations: Dtr hoping to give pt "a shot" at " recovering.  Fears/Anxiety/Concerns: Dtr stating that she is having trouble taking her own feelings out of pt's plan of care.         Preferences about EOL Environment: (own bed, family nearby, pets, music, etc)  Dtr stated that she would not want pt to return to [facility] if she is dying.     Complicated Bereavement Risk Assessment Tool (CBRAT)  Reference:  [facility] Clinical Practice Group (May 2016). Bereavement Risk Screening and Management Guidelines.  Retrieved from: http://www.grpcc.com.au/wp-content/uploads//VVBTE-Pzllsduvfts-Vxlcoqgea-and-Management-Guideline-2016.pdf      Client Characteristics (Bereaved Client)  ? Under 18      no  ? Was a Twin   no  ? Young Spouse   no  ? Elderly Spouse    no  ? Isolated    no  ? Lacks Meaningful Social Support   no  ? Dissatisfied with help available during illness   no  ? New to Financial Marblemount no  ? New to Decision-Making   no   History of Loss (Bereaved Client)  ? Cumulative Multiple Losses   yes  ? Previous Mental Health Illnesses   no  ? Current Mental Health Illness   yes  ? Other Significant Health Issues   no   ? Migrant/Refugee   no    Illness  ? Inherited Disorder   no  ? Stigmatized Disease in the   no  ?  Family/Community   yes  ? Lengthy/Burdensome   yes Relationship with   ? Profound Lifelong Partner   no  ? Highly Dependent    no  ? Antagonistic   no  ? Ambivalent   no  ? Deeply Connected   no  ? Culturally Defined   no   Death  ? Sudden or Unexpected   no  ? Traumatic Circumstances Associated with Death   no  ? Significant Cultural/Social Burdens as a result of Death   no   Risk Factors Scores  0-2  Low  3-5  Moderate  5+  High  All persons scoring moderate to high presume to be at risk**    (** It is acknowledged that protective factors and resilience may outweigh apparent risk factors.      Total Risk Factors Score:   Moderate    Dtr Maggy has increased risk. Out of her 6  "other siblings, she is the primary caregiver and visits pt every other day.  Maggy stated that she works as a Surgical Tech at [employer] and had a "nervous breakdown" last week in the OR. Dtr stated that despite her other siblings living here, she is the main person and feels closest to her mom.     Dtr also describes their family as "dysfunctional" but is unable to explain why she has described it like this.    SW will continue to follow and provide support and resources as needed. Dtr stated that she is currently seeing a therapist which is helping her with her anxiety issues. SW encouraged her to continue visiting and speaking with the therapist.       Discharge Planning Needs/Plan of Care:       SW assisted with speaking with pt's dtr Maggy and Megan by phone about pt's plan of care. Dtr Maggy is struggling with the loss of her mother. She has been the primary caregiver/visitor the last few years. Dtr verbalized wanting to keep her around as long as possible but also stating that pt has been "tired".    Dtr Maggy spoke about wanting to give pt a "time-limited trial" of the peg tube to see if pt would get better. SW explained that the peg tube is temporary. Dtr voiced understanding but also confirmed that pt would NOT want a peg tube long term. SW and APRN explained that if pt were to try and pull the peg tube out, team may have to restrain pt. Dtrs both agreed that if this happened, it may be a way to determine that this was not working out for the pt.     Explained to dtr that if pt did not improve hospice may be best option. Briefly explained hospice options. Will need to return to this topic as pt deteriorates.     SW to continue to follow and be available as needed.     Candice Lopez, RENETTA, ACHP-SW    "

## 2017-12-26 NOTE — NURSING
IV right antecubital infiltrated and removed intact.  Elevated on pillow.  Warm compresses applied x2.  Attempts by primary, OC, and another experienced RN to establish new IV unsuccessful.  Family members present and POC reviewed.  IM 2 paged via  to update on lack of IV access at this time.

## 2017-12-26 NOTE — ASSESSMENT & PLAN NOTE
Urinalysis positive with 78 WBC and many bacteria.  -Urine culture- Klebsiella and Providencia sensitive to rocephin and bactrm  -Blood cx- E coli that is pan sensitive  -Blood cx 12/24: NGTD  -continue rocephin 2g IV q 24h

## 2017-12-26 NOTE — HPI
Ms. Romano is a 86 y.o. Lady with PMH of  Alzheimers, CAD, CVA, hypertension, hyperlipidemia, CHF and SVT. She presented to the [facility] emergency department from the nursing home. atus.  Patient is non-verbal. History  obtained from her daughter who brought her to the ED.  Her daughter reports that at her baseline she is able to stand with a walker/ aids, assist with dressing by aids, and is normally able to communicate verbally.  Recent hospitalization at [facility] for urosepsis.

## 2017-12-26 NOTE — ASSESSMENT & PLAN NOTE
"Palliative care consulted for goals of care and advanced care planning     Met with patient and daughter Maggy at bedside, daughter Megan on telephone with Candice Lopez LCSW. Palliative care introduced to patient and daughter. Psychosocial assessment compelted.     Impression: Ms. Castrejon is an 87 yo lady with alzheimer's disease admitted from nursing home with urosepsis.  She open's eyes spontaneously, is non-verbal does not follow commands, no acute distress    Goals of Care:   - LaPOST received.  No HPOA on file   - DNR per primary team.  Family in agreement  - Patient unable to participate in conversation.  Jose Winter at bedside and daughter Megan appear to have marginal  understanding and insight to current clinical condition.  Maggy initially states this is a drastic change but also states she has seen subtle changes over the past several months.    - Majority of conversation related to nutritional status and placement of NG tube for tube feedings.  Conflicting information:  Daughter reports the physicians have inquired about starting tube feedings.  Primary team reports daughter Maggy has interest in tube feedings to give her "good chance of recovery.    - Patient's LaPOST clearly states no artifical nutrition by tube and IVF for hydration. Daughter is aware of this .   - Explained to daughter Maggy, that an NG is only temporary and the family would have to make a decision regarding long term options.  Maggy states she wants her mother to have a reasonable chance to recovery and if nutrition is helpful she wants her mother to have this intervention  - Maggy states having difficulty  her own wishes from her mother's wishes  - Palliative care recommended to the daughters to reach out to the remaining siblings to learn more about their wishes for their mother and encouraged them to think about their mother's wishes   - If there is a consensus among the children, palliative care " recommended a short time limited trial with the understanding if Ms. Castrejon pulls at the tube there is a possibility for restraint use and if she pulls the ng tube out it may not be replaced.    - Daughter, Megan states she will contact the rest of her siblings.   - Palliative care will follow up with the family's input tomorrow.   - very brief discussion concerning hospice, daughter appears not to be amenable to discussing at this time.       Recommendation:   1. Rapport established with family and will continue to follow.  2. Palliative care does not recommend initiating tube feedings via Ng/PEG  as this is not consistent with patient' goals of care and if this is the family's decision - that it be on a time limited basis as discussed with family   3. Family would benefit from  Additional discussion with primary team concerning response to treatment - antibiotics and prognosis.  4.  Patient is hospice appropriate at this time.  If she is unable to obtain nutrition or oral antibiotics, would recommend inpatient hospice.   5. Hospice education has not been completed.    Primary team notified of goals of care conversation with family

## 2017-12-26 NOTE — ASSESSMENT & PLAN NOTE
-suspect 2/2 UTI  -UCx- positive for Klebsiella and Providenicia sensitive to Rocephin and Bactrim  -BCx positive for E. Coli that is pansensitive  -blood cx 12/24-NGTD  -continue IV rocephin

## 2017-12-26 NOTE — CONSULTS
"[facility]  Palliative Medicine  Consult Note    Patient Name: Margarita Castrejon  MRN: [id number]  Admission Date: 12/23/2017  Hospital Length of Stay: 3 days  Code Status: DNR   Attending Provider: Jasson Lawrence MD  Consulting Provider: CURTIS Agrawal  Primary Care Physician: Patrick Landa MD  Principal Problem:SIRS (systemic inflammatory response syndrome)    Patient information was obtained from relative(s) and ER records.      Consults  Assessment/Plan:     Palliative care encounter    Palliative care consulted for goals of care and advanced care planning     Met with patient and daughter Maggy at bedside, daughter Megan on telephone with Candice Lopez LCSW. Palliative care introduced to patient and daughter. Psychosocial assessment compelted.     Impression: Ms. Castrejon is an 85 yo lady with alzheimer's disease admitted from nursing home with urosepsis.  She open's eyes spontaneously, is non-verbal does not follow commands, no acute distress    Goals of Care:   - LaPOST received.  No HPOA on file   - DNR per primary team.  Family in agreement  - Patient unable to participate in conversation.  Jose Winter at bedside and daughter Megan appear to have marginal  understanding and insight to current clinical condition.  Maggy initially states this is a drastic change but also states she has seen subtle changes over the past several months.    - Majority of conversation related to nutritional status and placement of NG tube for tube feedings.  Conflicting information:  Daughter reports the physicians have inquired about starting tube feedings.  Primary team reports jerome Winter has interest in tube feedings to give her "good chance of recovery.    - Patient's LaPOST clearly states no artifical nutrition by tube and IVF for hydration. Daughter is aware of this .   - Explained to jerome Winter, that an NG is only temporary and the family would have to make a decision " regarding long term options.  Maggy states she wants her mother to have a reasonable chance to recovery and if nutrition is helpful she wants her mother to have this intervention  - Maggy states having difficulty  her own wishes from her mother's wishes  - Palliative care recommended to the daughters to reach out to the remaining siblings to learn more about their wishes for their mother and encouraged them to think about their mother's wishes   - If there is a consensus among the children, palliative care recommended a short time limited trial with the understanding if Ms. Castrejon pulls at the tube there is a possibility for restraint use and if she pulls the ng tube out it may not be replaced.    - DaughterMegan states she will contact the rest of her siblings.   - Palliative care will follow up with the family's input tomorrow.   - very brief discussion concerning hospice, daughter appears not to be amenable to discussing at this time.       Recommendation:   1. Rapport established with family and will continue to follow.  2. Palliative care does not recommend initiating tube feedings via Ng/PEG  as this is not consistent with patient' goals of care and if this is the family's decision - that it be on a time limited basis as discussed with family   3. Family would benefit from  Additional discussion with primary team concerning response to treatment - antibiotics and prognosis.  4.  Patient is hospice appropriate at this time.  If she is unable to obtain nutrition or oral antibiotics, would recommend inpatient hospice.   5. Hospice education has not been completed.    Primary team notified of goals of care conversation with family                  Thank you for your consult. I will follow-up with patient. Please contact us if you have any additional questions.    Subjective:     HPI:   Ms. Romano is a 86 y.o. Lady with PMH of  Alzheimers, CAD, CVA, hypertension, hyperlipidemia, CHF and SVT. She  presented to the [facility] emergency department from the nursing home. atus.  Patient is non-verbal. History  obtained from her daughter who brought her to the ED.  Her daughter reports that at her baseline she is able to stand with a walker/ aids, assist with dressing by aids, and is normally able to communicate verbally.   Recent hospitalization at [facility] for urosepsis.        Hospital Course:  No notes on file    Interval History:     Past Medical History:   Diagnosis Date    Alzheimer disease     Coronary artery disease 6/28/2013 1993. CABG x 3. EJ.    CVA (cerebrovascular accident) 6/28/2013 2009.    Dementia     Depression     GERD (gastroesophageal reflux disease)     Heart failure, diastolic, acute on chronic 9/19/2016    820/2016: CXR: Mild CHF. Minimal effusions.    HTN (hypertension)     Hypercholesteremia 6/28/2013    Hypertension, benign 6/28/2013    Diagnosed 1990.    Osteoporosis, unspecified     Pacemaker 6/28/2013    10/22/2012: [facility]. Merlin not on because she is at NH.    Renal disorder     SSS (sick sinus syndrome) 6/28/2013    10/22/2012: [facility] P[facility].    Supraventricular tachycardia 11/18/2014       Past Surgical History:   Procedure Laterality Date    HERNIA REPAIR      kidney surgery         Review of patient's allergies indicates:   Allergen Reactions    Heparin analogues Shortness Of Breath    Phenytoin sodium extended Rash       Medications:  Continuous Infusions:   dextrose 5 % and 0.45 % NaCl 75 mL/hr at 12/26/17 1133     Scheduled Meds:   cefTRIAXone (ROCEPHIN) IVPB  2 g Intravenous Q24H    potassium chloride  10 mEq Intravenous Once     PRN Meds:acetaminophen, sodium chloride 0.9%    Family History     None        Social History Main Topics    Smoking status: Former Smoker     Packs/day: 1.00     Years: 40.00     Types: Cigarettes     Start date: 1/22/1969     Quit date: 1/1/2009    Smokeless tobacco: Never Used    Alcohol use No    Drug use: No     Sexual activity: Not Currently       Review of Systems   Unable to perform ROS: Dementia     Objective:     Vital Signs (Most Recent):  Temp: 98.2 °F (36.8 °C) (12/26/17 1218)  Pulse: 64 (12/26/17 1218)  Resp: 18 (12/26/17 1218)  BP: (!) 103/52 (12/26/17 1218)  SpO2: 96 % (12/26/17 1218) Vital Signs (24h Range):  Temp:  [96 °F (35.6 °C)-98.3 °F (36.8 °C)] 98.2 °F (36.8 °C)  Pulse:  [60-69] 64  Resp:  [18] 18  SpO2:  [92 %-97 %] 96 %  BP: ()/(52-56) 103/52        There is no height or weight on file to calculate BMI.    Review of Symptoms  Symptom Assessment (ESAS 0-10 scale)   ESAS 0 1 2 3 4 5 6 7 8 9 10   Pain              Dyspnea              Anxiety              Nausea              Depression               Anorexia              Fatigue              Insomnia              Restlessness               Agitation              CAM / Delirium __ --  ___+   Constipation     __ --  ___+   Diarrhea           __ --  ___+  Bowel Management Plan (BMP): Yes    Comments: Patient has dementia unable to complete review of symptoms, appears to be in no discomfort- no grimacing, no moaning.    Pain Assessment:   OME in 24 hours: 0    Performance Status: 10    ECOG Performance Status Grade: 4 - Completely disabled    Physical Exam   Constitutional:   Appears thin, not oriented,  Opens eyes spontaneously, unable to answer questions    HENT:   Head: Normocephalic and atraumatic.   Eyes: Pupils are equal, round, and reactive to light.   Cardiovascular: Normal rate and regular rhythm.    Pulmonary/Chest: Effort normal and breath sounds normal.   Abdominal: Soft.   Neurological:   Disoriented to person, place, time situation   Skin: Skin is warm and dry. There is pallor.   Psychiatric:   Patient has dementia, unable to assess       Significant Labs: All pertinent labs within the past 24 hours have been reviewed.  CBC:     Recent Labs  Lab 12/26/17  0549   WBC 9.61   HGB 11.6*   HCT 36.0*   MCV 96   *     BMP:    Recent  Labs  Lab 12/26/17  0549 12/26/17  0813   GLU  --  105   NA  --  146*   K  --  3.2*   CL  --  111*   CO2  --  27   BUN  --  42*   CREATININE  --  1.4   CALCIUM  --  9.7   MG 2.4  --      LFT:  Lab Results   Component Value Date    AST 17 12/23/2017    ALKPHOS 104 12/23/2017    BILITOT 1.4 (H) 12/23/2017     Albumin:   Albumin   Date Value Ref Range Status   12/23/2017 2.0 (L) 3.5 - 5.2 g/dL Final     Protein:   Total Protein   Date Value Ref Range Status   12/23/2017 7.2 6.0 - 8.4 g/dL Final     Lactic acid:   Lab Results   Component Value Date    LACTATE 1.6 12/29/2011    LACTATE 1.7 08/05/2009       Significant Imaging: I have reviewed all pertinent imaging results/findings within the past 24 hours.    Advanced Directives::  Living Will: No  LaPOST: Yes  Do Not Resuscitate Status: Yes  Medical Power of : No: Patient has 7 children, requires majority of children to make medical decisions     Decision-Making Capacity: Family answered questions    Living Arrangements: Lives in nursing home    Psychosocial/Cultural: Not ,  7 adult children, numerous grandchildren and great grand children.  Worked as a  at [employer] for more than 30 yrs.  Past time spent raising her children.  Daughter Megan reports the family is dyssfunctional. Currently lives at [facility].        Spiritual:     F- Erna and Belief: Spiritism       I - Importance: Daughter Maggy reports she did not practice  .  C - Community:     A - Address in Care:  services offered       > 50% of 70 min visit spent in chart review, face to face discussion of goals of care,  symptom assessment, coordination of care and emotional support.    ANA Llamas, ACNS-BC, OCN   Palliative Medicine  [facility]

## 2017-12-26 NOTE — CONSULTS
Pt currently has working PIV and no further access needed at this time. RN and team notified. Please re consult if further assistance is needed.

## 2017-12-26 NOTE — CONSULTS
[facility]  Palliative Medicine  Consult Note    Patient Name: Margarita Castrejon  MRN: [id number]  Admission Date: 12/23/2017  Hospital Length of Stay: 3 days  Code Status: DNR   Attending Provider: Jasson Lawrence MD  Consulting Provider: CURTIS Agrawal  Primary Care Physician: Patrick Landa MD  Principal Problem:SIRS (systemic inflammatory response syndrome)         Inpatient consult to Palliative Care  Consult performed by: YESSICA RENDON  Consult ordered by: CRISTOFER ALMAZAN  Reason for consult: goals of care discussion   Assessment/Recommendations: Palliative Care Acknowledgement of Consult - .date 12/26/16 7:49 AM    Consult received. Chart reviewed and patient discussed with Dr. Gandhi.  On initial assessment 10 AM 12/26/17 Ms. Castrejon unable to participate in conversation.  Family unavailable.  Palliative care will reach out to patient's family by telephone .   .  Full consult to follow.    Thank you for opportunity to participate in Ms. Castrejon's care .

## 2017-12-26 NOTE — PT/OT/SLP PROGRESS
Speech Language Pathology Treatment    Patient Name:  Margarita Castrejon   MRN:  [id number]  Admitting Diagnosis: SIRS (systemic inflammatory response syndrome)    Recommendations:                 General Recommendations:  Dysphagia therapy  Diet recommendations:  NPO, Liquid Diet Level: NPO   Aspiration Precautions: Alternate means of nutrition/hydration   General Precautions: Standard, aspiration, fall, NPO  Communication strategies:  minimal communication at this time    Subjective     Pt did not vocalize or verbalize.  Accepted PO presentations, but poor ability to follow commands and poor ABRAHAN.    Pain/Comfort:  · Pain Rating 1: 0/10    Objective:     Has the patient been evaluated by SLP for swallowing?   Yes  Keep patient NPO? Yes   Current Respiratory Status:        Pt asleep upon entry.  Able to rouse with repeated verbal and tactile stimulation, but unable to maintain for entire session. PCT entered to assist SLP with repositioning pt to more upright position to increase safety with PO trials.  Pt tended onto turn on right side, however.  Pt accepted PO trials of thin water via tsp x 2, cup sip x 1, multiple straw sips, and 1/2 tsp pudding x 1 and full tsp x 1.  Pt with decreased closure around spoon and decreased ability to strip pureed bolus from spoon.  Oral transit and timing of pharyngeal swallow were slow for thin liquids, but pt elicited without need for cues for initial trials.  Pt initiate oral transit for pureed trials, but required max cues and sips of thin water to elicit a pharyngeal swallow.  Pt did not achieve adequate suction from straw for subsequent straw sip presentations due to diminishing alertness and responsiveness.  SLP discussed with nurse recommendations for pt to remain NPO at this time as pt continues to remain at high risk pf aspiration with decreased ability to maintain nutrition/hydration orally due to decreased ABRAHAN and cognition.  These recommendations were also later discussed  "with MD who discussed family's wishes to place NG tube at this time.  If pt's swallowing function does not improve once increased nutrition has been provided, pt's family is not interested in long-term means of nutrition/hydration and would then like for NG tube to be removed with recommendations for safest consistencies for "pleasure feedings" with known risk of aspiration.     Assessment:     Margarita Castrejon is a 86 y.o. female with an SLP diagnosis of Dysphagia.      Goals:    SLP Goals        Problem: SLP Goal    Goal Priority Disciplines Outcome   SLP Goal     SLP Ongoing (interventions implemented as appropriate)   Description:  Speech Language Pathology Goals  Goals expected to be met by 12/31  1. Pt will participate in ongoing swallow assessment to determine least restrictive diet.                        Plan:     · Patient to be seen:  5 x/week   · Plan of Care expires:  01/22/18  · Plan of Care reviewed with:  patient   · SLP Follow-Up:  Yes       Discharge recommendations:   (pending progress and PT/OT recs)     Time Tracking:     SLP Treatment Date:   12/26/17  Speech Start Time:  1028  Speech Stop Time:  1041     Speech Total Time (min):  13 min    Billable Minutes: Treatment Swallowing Dysfunction 13    ELIECER Santiago, CCC-SLP  12/26/2017     ELIECER Santiago, CCC-SLP  Speech Language Pathologist  [phone]  12/26/2017    "

## 2017-12-26 NOTE — SUBJECTIVE & OBJECTIVE
Interval History:     Past Medical History:   Diagnosis Date    Alzheimer disease     Coronary artery disease 6/28/2013 1993. CABG x 3. EJ.    CVA (cerebrovascular accident) 6/28/2013 2009.    Dementia     Depression     GERD (gastroesophageal reflux disease)     Heart failure, diastolic, acute on chronic 9/19/2016    820/2016: CXR: Mild CHF. Minimal effusions.    HTN (hypertension)     Hypercholesteremia 6/28/2013    Hypertension, benign 6/28/2013    Diagnosed 1990.    Osteoporosis, unspecified     Pacemaker 6/28/2013    10/22/2012: [facility]DR PPM. Merlin not on because she is at NH.    Renal disorder     SSS (sick sinus syndrome) 6/28/2013    10/22/2012: [facility]DR PPM.    Supraventricular tachycardia 11/18/2014       Past Surgical History:   Procedure Laterality Date    HERNIA REPAIR      kidney surgery         Review of patient's allergies indicates:   Allergen Reactions    Heparin analogues Shortness Of Breath    Phenytoin sodium extended Rash       Medications:  Continuous Infusions:   dextrose 5 % and 0.45 % NaCl 75 mL/hr at 12/26/17 1133     Scheduled Meds:   cefTRIAXone (ROCEPHIN) IVPB  2 g Intravenous Q24H    potassium chloride  10 mEq Intravenous Once     PRN Meds:acetaminophen, sodium chloride 0.9%    Family History     None        Social History Main Topics    Smoking status: Former Smoker     Packs/day: 1.00     Years: 40.00     Types: Cigarettes     Start date: 1/22/1969     Quit date: 1/1/2009    Smokeless tobacco: Never Used    Alcohol use No    Drug use: No    Sexual activity: Not Currently       Review of Systems   Unable to perform ROS: Dementia     Objective:     Vital Signs (Most Recent):  Temp: 98.2 °F (36.8 °C) (12/26/17 1218)  Pulse: 64 (12/26/17 1218)  Resp: 18 (12/26/17 1218)  BP: (!) 103/52 (12/26/17 1218)  SpO2: 96 % (12/26/17 1218) Vital Signs (24h Range):  Temp:  [96 °F (35.6 °C)-98.3 °F (36.8 °C)] 98.2 °F (36.8 °C)  Pulse:  [60-69] 64  Resp:  [18]  18  SpO2:  [92 %-97 %] 96 %  BP: ()/(52-56) 103/52        There is no height or weight on file to calculate BMI.    Review of Symptoms  Symptom Assessment (ESAS 0-10 scale)   ESAS 0 1 2 3 4 5 6 7 8 9 10   Pain              Dyspnea              Anxiety              Nausea              Depression               Anorexia              Fatigue              Insomnia              Restlessness               Agitation              CAM / Delirium __ --  ___+   Constipation     __ --  ___+   Diarrhea           __ --  ___+  Bowel Management Plan (BMP): Yes    Comments: Patient has dementia unable to complete review of symptoms, appears to be in no discomfort- no grimacing, no moaning.    Pain Assessment:   OME in 24 hours: 0    Performance Status: 10    ECOG Performance Status Grade: 4 - Completely disabled    Physical Exam   Constitutional:   Appears thin, not oriented,  Opens eyes spontaneously, unable to answer questions    HENT:   Head: Normocephalic and atraumatic.   Eyes: Pupils are equal, round, and reactive to light.   Cardiovascular: Normal rate and regular rhythm.    Pulmonary/Chest: Effort normal and breath sounds normal.   Abdominal: Soft.   Neurological:   Disoriented to person, place, time situation   Skin: Skin is warm and dry. There is pallor.   Psychiatric:   Patient has dementia, unable to assess       Significant Labs: All pertinent labs within the past 24 hours have been reviewed.  CBC:     Recent Labs  Lab 12/26/17  0549   WBC 9.61   HGB 11.6*   HCT 36.0*   MCV 96   *     BMP:    Recent Labs  Lab 12/26/17  0549 12/26/17  0813   GLU  --  105   NA  --  146*   K  --  3.2*   CL  --  111*   CO2  --  27   BUN  --  42*   CREATININE  --  1.4   CALCIUM  --  9.7   MG 2.4  --      LFT:  Lab Results   Component Value Date    AST 17 12/23/2017    ALKPHOS 104 12/23/2017    BILITOT 1.4 (H) 12/23/2017     Albumin:   Albumin   Date Value Ref Range Status   12/23/2017 2.0 (L) 3.5 - 5.2 g/dL Final     Protein:    Total Protein   Date Value Ref Range Status   12/23/2017 7.2 6.0 - 8.4 g/dL Final     Lactic acid:   Lab Results   Component Value Date    LACTATE 1.6 12/29/2011    LACTATE 1.7 08/05/2009       Significant Imaging: I have reviewed all pertinent imaging results/findings within the past 24 hours.    Advanced Directives::  Living Will: No  LaPOST: Yes  Do Not Resuscitate Status: Yes  Medical Power of : No: Patient has 7 children, requires majority of children to make medical decisions     Decision-Making Capacity: Family answered questions    Living Arrangements: Lives in nursing home    Psychosocial/Cultural: Not ,  7 adult children, numerous grandchildren and great grand children.  Worked as a  at [employer] for more than 30 yrs.  Past time spent raising her children.  Daughter Megan reports the family is dyssfunctional. Currently lives at [facility].        Spiritual:     F- Erna and Belief: Yazdanism       I - Importance: Daughter Maggy reports she did not practice  .  C - Community:     A - Address in Care:  services offered

## 2017-12-26 NOTE — ASSESSMENT & PLAN NOTE
Sodium 152 upon admission.  Likely etiologies include impaired access to free water given her mental status and the continuation of lasix.  -Rehydration with D5 @75cc/hr with no improvement, will increase rate of fluid  -BMP q 8 to assess for the rate of correction  -Na 146 this AM.   -continue to monitor

## 2017-12-26 NOTE — PLAN OF CARE
Problem: Fall Risk (Adult)  Intervention: Reduce Risk/Promote Restraint Free Environment   17 0536   Safety Interventions   Environmental Safety Modification room organization consistent   Safety Interventions   Safety Precautions emergency equipment at bedside   Prevent  Drop/Fall   Safety/Security Measures bed alarm set

## 2017-12-26 NOTE — SUBJECTIVE & OBJECTIVE
Interval History: No acute events overnight.  Patient continues to be non-verbal.  Sodium at 146 today despite infusion of D5 1/2NS.  Palliative care to talk to family today.      Review of Systems   Unable to perform ROS: Patient nonverbal     Objective:     Vital Signs (Most Recent):  Temp: 98.2 °F (36.8 °C) (12/26/17 1218)  Pulse: 64 (12/26/17 1218)  Resp: 18 (12/26/17 1218)  BP: (!) 103/52 (12/26/17 1218)  SpO2: 96 % (12/26/17 1218) Vital Signs (24h Range):  Temp:  [96 °F (35.6 °C)-98.3 °F (36.8 °C)] 98.2 °F (36.8 °C)  Pulse:  [60-69] 64  Resp:  [18] 18  SpO2:  [92 %-97 %] 96 %  BP: ()/(52-56) 103/52        There is no height or weight on file to calculate BMI.    Intake/Output Summary (Last 24 hours) at 12/26/17 1348  Last data filed at 12/26/17 0600   Gross per 24 hour   Intake              200 ml   Output                0 ml   Net              200 ml      Physical Exam   Constitutional:   86 year old female appearing stated age, nonverbal, does not appear to be in acute distress   HENT:   Head: Normocephalic and atraumatic.   Eyes: EOM are normal. Pupils are equal, round, and reactive to light. No scleral icterus.   Neck: Normal range of motion. Neck supple. No JVD present. No tracheal deviation present.   Cardiovascular: Normal rate, regular rhythm and normal heart sounds.  Exam reveals no gallop and no friction rub.    No murmur heard.  Pulmonary/Chest: Effort normal and breath sounds normal. No stridor. No respiratory distress. She has no wheezes. She has no rales.   Abdominal: Soft. She exhibits no distension and no mass. There is no guarding.   Musculoskeletal: She exhibits no edema or deformity.   Neurological:   Patient non-verbal. Opens eyes to verbal and tactile stimulation. Does not follow commands.   Skin: Skin is warm and dry.       Significant Labs:   Recent Results (from the past 24 hour(s))   Basic metabolic panel    Collection Time: 12/25/17  3:58 PM   Result Value Ref Range    Sodium  144 136 - 145 mmol/L    Potassium 4.0 3.5 - 5.1 mmol/L    Chloride 112 (H) 95 - 110 mmol/L    CO2 25 23 - 29 mmol/L    Glucose 104 70 - 110 mg/dL    BUN, Bld 44 (H) 8 - 23 mg/dL    Creatinine 1.6 (H) 0.5 - 1.4 mg/dL    Calcium 10.1 8.7 - 10.5 mg/dL    Anion Gap 7 (L) 8 - 16 mmol/L    eGFR if African American 33.4 (A) >60 mL/min/1.73 m^2    eGFR if non  29.0 (A) >60 mL/min/1.73 m^2   Basic metabolic panel    Collection Time: 12/26/17 12:08 AM   Result Value Ref Range    Sodium 146 (H) 136 - 145 mmol/L    Potassium 3.5 3.5 - 5.1 mmol/L    Chloride 109 95 - 110 mmol/L    CO2 26 23 - 29 mmol/L    Glucose 94 70 - 110 mg/dL    BUN, Bld 43 (H) 8 - 23 mg/dL    Creatinine 1.5 (H) 0.5 - 1.4 mg/dL    Calcium 10.3 8.7 - 10.5 mg/dL    Anion Gap 11 8 - 16 mmol/L    eGFR if African American 36.1 (A) >60 mL/min/1.73 m^2    eGFR if non  31.3 (A) >60 mL/min/1.73 m^2   Magnesium    Collection Time: 12/26/17  5:49 AM   Result Value Ref Range    Magnesium 2.4 1.6 - 2.6 mg/dL   Phosphorus    Collection Time: 12/26/17  5:49 AM   Result Value Ref Range    Phosphorus 2.2 (L) 2.7 - 4.5 mg/dL   CBC auto differential    Collection Time: 12/26/17  5:49 AM   Result Value Ref Range    WBC 9.61 3.90 - 12.70 K/uL    RBC 3.74 (L) 4.00 - 5.40 M/uL    Hemoglobin 11.6 (L) 12.0 - 16.0 g/dL    Hematocrit 36.0 (L) 37.0 - 48.5 %    MCV 96 82 - 98 fL    MCH 31.0 27.0 - 31.0 pg    MCHC 32.2 32.0 - 36.0 g/dL    RDW 15.8 (H) 11.5 - 14.5 %    Platelets 107 (L) 150 - 350 K/uL    MPV 11.2 9.2 - 12.9 fL    Immature Granulocytes 1.4 (H) 0.0 - 0.5 %    Gran # 6.8 1.8 - 7.7 K/uL    Immature Grans (Abs) 0.13 (H) 0.00 - 0.04 K/uL    Lymph # 1.7 1.0 - 4.8 K/uL    Mono # 0.8 0.3 - 1.0 K/uL    Eos # 0.1 0.0 - 0.5 K/uL    Baso # 0.03 0.00 - 0.20 K/uL    nRBC 0 0 /100 WBC    Gran% 70.9 38.0 - 73.0 %    Lymph% 17.6 (L) 18.0 - 48.0 %    Mono% 8.4 4.0 - 15.0 %    Eosinophil% 1.4 0.0 - 8.0 %    Basophil% 0.3 0.0 - 1.9 %    Differential Method  Automated    Basic metabolic panel    Collection Time: 12/26/17  8:13 AM   Result Value Ref Range    Sodium 146 (H) 136 - 145 mmol/L    Potassium 3.2 (L) 3.5 - 5.1 mmol/L    Chloride 111 (H) 95 - 110 mmol/L    CO2 27 23 - 29 mmol/L    Glucose 105 70 - 110 mg/dL    BUN, Bld 42 (H) 8 - 23 mg/dL    Creatinine 1.4 0.5 - 1.4 mg/dL    Calcium 9.7 8.7 - 10.5 mg/dL    Anion Gap 8 8 - 16 mmol/L    eGFR if African American 39.2 (A) >60 mL/min/1.73 m^2    eGFR if non  34.0 (A) >60 mL/min/1.73 m^2         Significant Imaging: No new imaging

## 2017-12-26 NOTE — ASSESSMENT & PLAN NOTE
Patient with history of chronic kidney disease with a baseline ~1.1.  Per nursing home records and patients daughter she has had poor PO intake especially over the last week.  UA consistent with dehydration with 16 hyaline cast.  Another contributing factor could be her urinary tract infection.  -improving with IVFs  -avoid nephrotoxic agents

## 2017-12-26 NOTE — PROGRESS NOTES
[facility] Hospital Medicine  Progress Note    Patient Name: Margarita Castrejon  MRN: [id number]  Patient Class: IP- Inpatient   Admission Date: 12/23/2017  Length of Stay: 3 days  Attending Physician: Jasson Lawrence MD  Primary Care Provider: Patrick Landa MD    Hospital Medicine Team: [facility] 2 Eduardo Fitzgerald MD    Subjective:     Principal Problem:SIRS (systemic inflammatory response syndrome)    HPI:  Margarita Castrejon is a 86 y.o. with Alzheimers, CAD, CVA, hypertension, hyperlipidemia, CHF and SVT who presented to the [facility] [place] from the nursing home where she resides with altered mental status.  Patient is currently non-verbal and the history was obtained from her daughter who brought her to the ED.  Her daughter reports that at her baseline she is able to stand with a walker/ aids, assist with dressing by aids, and is normally able to communicate verbally.  She also reports that she was hospitalized at [facility] for urosepsis.      Hospital Course:  Margarita Castrejon was admitted to internal medicine on 12/23/2017.  Patient was found to be hypernatremic and she was started on D5.  She was continued on Rocephin for presumed UTI.  Blood cx growing gram Neg rods. Na normalized at 144 and D5W was discontinued. However, Na trending up and pt NPO , so started on D5/.45NS 75cc/hr Palliative consulted for assistance with family goals of care discussion.     Interval History: No acute events overnight.  Patient continues to be non-verbal.  Sodium at 146 today despite infusion of D5 1/2NS.  Palliative care to talk to family today.      Review of Systems   Unable to perform ROS: Patient nonverbal     Objective:     Vital Signs (Most Recent):  Temp: 98.2 °F (36.8 °C) (12/26/17 1218)  Pulse: 64 (12/26/17 1218)  Resp: 18 (12/26/17 1218)  BP: (!) 103/52 (12/26/17 1218)  SpO2: 96 % (12/26/17 1218) Vital Signs (24h Range):  Temp:  [96 °F (35.6 °C)-98.3 °F (36.8 °C)] 98.2 °F (36.8  °C)  Pulse:  [60-69] 64  Resp:  [18] 18  SpO2:  [92 %-97 %] 96 %  BP: ()/(52-56) 103/52        There is no height or weight on file to calculate BMI.    Intake/Output Summary (Last 24 hours) at 12/26/17 1348  Last data filed at 12/26/17 0600   Gross per 24 hour   Intake              200 ml   Output                0 ml   Net              200 ml      Physical Exam   Constitutional:   86 year old female appearing stated age, nonverbal, does not appear to be in acute distress   HENT:   Head: Normocephalic and atraumatic.   Eyes: EOM are normal. Pupils are equal, round, and reactive to light. No scleral icterus.   Neck: Normal range of motion. Neck supple. No JVD present. No tracheal deviation present.   Cardiovascular: Normal rate, regular rhythm and normal heart sounds.  Exam reveals no gallop and no friction rub.    No murmur heard.  Pulmonary/Chest: Effort normal and breath sounds normal. No stridor. No respiratory distress. She has no wheezes. She has no rales.   Abdominal: Soft. She exhibits no distension and no mass. There is no guarding.   Musculoskeletal: She exhibits no edema or deformity.   Neurological:   Patient non-verbal. Opens eyes to verbal and tactile stimulation. Does not follow commands.   Skin: Skin is warm and dry.       Significant Labs:   Recent Results (from the past 24 hour(s))   Basic metabolic panel    Collection Time: 12/25/17  3:58 PM   Result Value Ref Range    Sodium 144 136 - 145 mmol/L    Potassium 4.0 3.5 - 5.1 mmol/L    Chloride 112 (H) 95 - 110 mmol/L    CO2 25 23 - 29 mmol/L    Glucose 104 70 - 110 mg/dL    BUN, Bld 44 (H) 8 - 23 mg/dL    Creatinine 1.6 (H) 0.5 - 1.4 mg/dL    Calcium 10.1 8.7 - 10.5 mg/dL    Anion Gap 7 (L) 8 - 16 mmol/L    eGFR if African American 33.4 (A) >60 mL/min/1.73 m^2    eGFR if non  29.0 (A) >60 mL/min/1.73 m^2   Basic metabolic panel    Collection Time: 12/26/17 12:08 AM   Result Value Ref Range    Sodium 146 (H) 136 - 145 mmol/L     Potassium 3.5 3.5 - 5.1 mmol/L    Chloride 109 95 - 110 mmol/L    CO2 26 23 - 29 mmol/L    Glucose 94 70 - 110 mg/dL    BUN, Bld 43 (H) 8 - 23 mg/dL    Creatinine 1.5 (H) 0.5 - 1.4 mg/dL    Calcium 10.3 8.7 - 10.5 mg/dL    Anion Gap 11 8 - 16 mmol/L    eGFR if African American 36.1 (A) >60 mL/min/1.73 m^2    eGFR if non  31.3 (A) >60 mL/min/1.73 m^2   Magnesium    Collection Time: 12/26/17  5:49 AM   Result Value Ref Range    Magnesium 2.4 1.6 - 2.6 mg/dL   Phosphorus    Collection Time: 12/26/17  5:49 AM   Result Value Ref Range    Phosphorus 2.2 (L) 2.7 - 4.5 mg/dL   CBC auto differential    Collection Time: 12/26/17  5:49 AM   Result Value Ref Range    WBC 9.61 3.90 - 12.70 K/uL    RBC 3.74 (L) 4.00 - 5.40 M/uL    Hemoglobin 11.6 (L) 12.0 - 16.0 g/dL    Hematocrit 36.0 (L) 37.0 - 48.5 %    MCV 96 82 - 98 fL    MCH 31.0 27.0 - 31.0 pg    MCHC 32.2 32.0 - 36.0 g/dL    RDW 15.8 (H) 11.5 - 14.5 %    Platelets 107 (L) 150 - 350 K/uL    MPV 11.2 9.2 - 12.9 fL    Immature Granulocytes 1.4 (H) 0.0 - 0.5 %    Gran # 6.8 1.8 - 7.7 K/uL    Immature Grans (Abs) 0.13 (H) 0.00 - 0.04 K/uL    Lymph # 1.7 1.0 - 4.8 K/uL    Mono # 0.8 0.3 - 1.0 K/uL    Eos # 0.1 0.0 - 0.5 K/uL    Baso # 0.03 0.00 - 0.20 K/uL    nRBC 0 0 /100 WBC    Gran% 70.9 38.0 - 73.0 %    Lymph% 17.6 (L) 18.0 - 48.0 %    Mono% 8.4 4.0 - 15.0 %    Eosinophil% 1.4 0.0 - 8.0 %    Basophil% 0.3 0.0 - 1.9 %    Differential Method Automated    Basic metabolic panel    Collection Time: 12/26/17  8:13 AM   Result Value Ref Range    Sodium 146 (H) 136 - 145 mmol/L    Potassium 3.2 (L) 3.5 - 5.1 mmol/L    Chloride 111 (H) 95 - 110 mmol/L    CO2 27 23 - 29 mmol/L    Glucose 105 70 - 110 mg/dL    BUN, Bld 42 (H) 8 - 23 mg/dL    Creatinine 1.4 0.5 - 1.4 mg/dL    Calcium 9.7 8.7 - 10.5 mg/dL    Anion Gap 8 8 - 16 mmol/L    eGFR if African American 39.2 (A) >60 mL/min/1.73 m^2    eGFR if non  34.0 (A) >60 mL/min/1.73 m^2         Significant  Imaging: No new imaging    Assessment/Plan:      * SIRS (systemic inflammatory response syndrome)    Patient does not meet criteria for sepsis at this time.  SIRS 1/4 for elevated WBC count with likely source of infection being from the urinary tract.  --as under UTI          Acute cystitis without hematuria    Urinalysis positive with 78 WBC and many bacteria.  -Urine culture- Klebsiella and Providencia sensitive to rocephin and bactrm  -Blood cx- E coli that is pan sensitive  -Blood cx 12/24: NGTD  -continue rocephin 2g IV q 24h          Acute kidney injury superimposed on chronic kidney disease    Patient with history of chronic kidney disease with a baseline ~1.1.  Per nursing home records and patients daughter she has had poor PO intake especially over the last week.  UA consistent with dehydration with 16 hyaline cast.  Another contributing factor could be her urinary tract infection.  -improving with IVFs  -avoid nephrotoxic agents          Gram-negative bacteremia    -suspect 2/2 UTI  -UCx- positive for Klebsiella and Providenicia sensitive to Rocephin and Bactrim  -BCx positive for E. Coli that is pansensitive  -blood cx 12/24-NGTD  -continue IV rocephin           Acute encephalopathy    Patient reported to be interactive and verbal at baseline.  Over last week has become non-verbal.    -Etiology likely infection (UTI) and gram negative bacteremia  -Hypernatremia and hypercalcemia also probably contributing   -WIll continue to treat UTI and hypernatremia to assess for improvement  -Though unlikely condition could represent progression of dementia  -palliative consulted for assistance with goals of care discussion          Heart failure    Patients family reports recent diagnosis of heart failure.  No recent echocardiogram in records  -Will continue to hold lasix and ACEi in setting of LUCIEN  -Resume beta blocker when able           Hypercalcemia    Calcium 11.5 that corrects to 13.1 on admission;  improving  -IVF  -regular BMP  -Will hold lasix for now given LUCIEN          Hypernatremia    Sodium 152 upon admission.  Likely etiologies include impaired access to free water given her mental status and the continuation of lasix.  -Rehydration with D5 @75cc/hr with no improvement, will increase rate of fluid  -BMP q 8 to assess for the rate of correction  -Na 146 this AM.   -continue to monitor          Alzheimer's dementia    Normal home medications including donepezil and memantine   -Per ST pt to remain NPO  -Resume medications when patient safely tolerating oral intake  - will discuss NG tube placement with family        Hypercholesteremia              Coronary artery disease    Holding  aspirin 81mg while NPO          Hypertension    Continue amlodipine when able to take PO intake  Currently BP at goal        Sick sinus syndrome    S/p pace maker placement            VTE Risk Mitigation         Ordered     High Risk of VTE  Once      12/23/17 2058     Place sequential compression device  Until discontinued      12/23/17 1657              Eduardo Fitzgerald MD  Department of Hospital Medicine   [facility]

## 2017-12-26 NOTE — PLAN OF CARE
Problem: SLP Goal  Goal: SLP Goal  Speech Language Pathology Goals  Goals expected to be met by 12/31  1. Pt will participate in ongoing swallow assessment to determine least restrictive diet.       Outcome: Ongoing (interventions implemented as appropriate)  Pt continues to appear unable to safely and efficiently maintain nutrition/hydration orally at this time. Cont POC.

## 2017-12-27 PROBLEM — R65.10 SIRS (SYSTEMIC INFLAMMATORY RESPONSE SYNDROME): Status: ACTIVE | Noted: 2017-12-27

## 2017-12-27 LAB
1,25(OH)2D3 SERPL-MCNC: 15 PG/ML
ANION GAP SERPL CALC-SCNC: 5 MMOL/L
ANION GAP SERPL CALC-SCNC: 7 MMOL/L
ANION GAP SERPL CALC-SCNC: 7 MMOL/L
BASOPHILS # BLD AUTO: 0.03 K/UL
BASOPHILS NFR BLD: 0.3 %
BUN SERPL-MCNC: 28 MG/DL
BUN SERPL-MCNC: 32 MG/DL
BUN SERPL-MCNC: 36 MG/DL
CALCIUM SERPL-MCNC: 8.9 MG/DL
CALCIUM SERPL-MCNC: 9 MG/DL
CALCIUM SERPL-MCNC: 9.3 MG/DL
CHLORIDE SERPL-SCNC: 109 MMOL/L
CHLORIDE SERPL-SCNC: 110 MMOL/L
CHLORIDE SERPL-SCNC: 111 MMOL/L
CO2 SERPL-SCNC: 25 MMOL/L
CO2 SERPL-SCNC: 28 MMOL/L
CO2 SERPL-SCNC: 29 MMOL/L
CREAT SERPL-MCNC: 1.1 MG/DL
CREAT SERPL-MCNC: 1.3 MG/DL
CREAT SERPL-MCNC: 1.3 MG/DL
DIFFERENTIAL METHOD: ABNORMAL
EOSINOPHIL # BLD AUTO: 0.2 K/UL
EOSINOPHIL NFR BLD: 1.9 %
ERYTHROCYTE [DISTWIDTH] IN BLOOD BY AUTOMATED COUNT: 15.8 %
EST. GFR  (AFRICAN AMERICAN): 42.9 ML/MIN/1.73 M^2
EST. GFR  (AFRICAN AMERICAN): 42.9 ML/MIN/1.73 M^2
EST. GFR  (AFRICAN AMERICAN): 52.5 ML/MIN/1.73 M^2
EST. GFR  (NON AFRICAN AMERICAN): 37.2 ML/MIN/1.73 M^2
EST. GFR  (NON AFRICAN AMERICAN): 37.2 ML/MIN/1.73 M^2
EST. GFR  (NON AFRICAN AMERICAN): 45.6 ML/MIN/1.73 M^2
GLUCOSE SERPL-MCNC: 108 MG/DL
GLUCOSE SERPL-MCNC: 116 MG/DL
GLUCOSE SERPL-MCNC: 97 MG/DL
HCT VFR BLD AUTO: 31.9 %
HGB BLD-MCNC: 10.2 G/DL
IMM GRANULOCYTES # BLD AUTO: 0.09 K/UL
IMM GRANULOCYTES NFR BLD AUTO: 1 %
LYMPHOCYTES # BLD AUTO: 1.5 K/UL
LYMPHOCYTES NFR BLD: 16.5 %
MAGNESIUM SERPL-MCNC: 2.1 MG/DL
MCH RBC QN AUTO: 30.1 PG
MCHC RBC AUTO-ENTMCNC: 32 G/DL
MCV RBC AUTO: 94 FL
MONOCYTES # BLD AUTO: 0.8 K/UL
MONOCYTES NFR BLD: 9.6 %
NEUTROPHILS # BLD AUTO: 6.2 K/UL
NEUTROPHILS NFR BLD: 70.7 %
NRBC BLD-RTO: 0 /100 WBC
PHOSPHATE SERPL-MCNC: 1.9 MG/DL
PLATELET # BLD AUTO: 91 K/UL
PMV BLD AUTO: 10.8 FL
POTASSIUM SERPL-SCNC: 3.3 MMOL/L
POTASSIUM SERPL-SCNC: 3.3 MMOL/L
POTASSIUM SERPL-SCNC: 3.6 MMOL/L
RBC # BLD AUTO: 3.39 M/UL
SODIUM SERPL-SCNC: 142 MMOL/L
SODIUM SERPL-SCNC: 144 MMOL/L
SODIUM SERPL-SCNC: 145 MMOL/L
WBC # BLD AUTO: 8.77 K/UL

## 2017-12-27 PROCEDURE — 36415 COLL VENOUS BLD VENIPUNCTURE: CPT

## 2017-12-27 PROCEDURE — 11000001 HC ACUTE MED/SURG PRIVATE ROOM

## 2017-12-27 PROCEDURE — 84100 ASSAY OF PHOSPHORUS: CPT

## 2017-12-27 PROCEDURE — 97530 THERAPEUTIC ACTIVITIES: CPT

## 2017-12-27 PROCEDURE — 25000003 PHARM REV CODE 250: Performed by: INTERNAL MEDICINE

## 2017-12-27 PROCEDURE — 63600175 PHARM REV CODE 636 W HCPCS: Performed by: STUDENT IN AN ORGANIZED HEALTH CARE EDUCATION/TRAINING PROGRAM

## 2017-12-27 PROCEDURE — 92526 ORAL FUNCTION THERAPY: CPT

## 2017-12-27 PROCEDURE — 99233 SBSQ HOSP IP/OBS HIGH 50: CPT | Mod: GC,,, | Performed by: HOSPITALIST

## 2017-12-27 PROCEDURE — 97165 OT EVAL LOW COMPLEX 30 MIN: CPT

## 2017-12-27 PROCEDURE — 97535 SELF CARE MNGMENT TRAINING: CPT

## 2017-12-27 PROCEDURE — 80048 BASIC METABOLIC PNL TOTAL CA: CPT | Mod: 91

## 2017-12-27 PROCEDURE — 25000003 PHARM REV CODE 250: Performed by: STUDENT IN AN ORGANIZED HEALTH CARE EDUCATION/TRAINING PROGRAM

## 2017-12-27 PROCEDURE — 83735 ASSAY OF MAGNESIUM: CPT

## 2017-12-27 PROCEDURE — 85025 COMPLETE CBC W/AUTO DIFF WBC: CPT

## 2017-12-27 PROCEDURE — 80048 BASIC METABOLIC PNL TOTAL CA: CPT

## 2017-12-27 PROCEDURE — S5010 5% DEXTROSE AND 0.45% SALINE: HCPCS | Performed by: INTERNAL MEDICINE

## 2017-12-27 RX ORDER — CEFPODOXIME PROXETIL 200 MG/1
200 TABLET, FILM COATED ORAL EVERY 12 HOURS
Status: DISCONTINUED | OUTPATIENT
Start: 2017-12-27 | End: 2017-12-27

## 2017-12-27 RX ORDER — IPRATROPIUM BROMIDE AND ALBUTEROL SULFATE 2.5; .5 MG/3ML; MG/3ML
3 SOLUTION RESPIRATORY (INHALATION) EVERY 4 HOURS PRN
COMMUNITY

## 2017-12-27 RX ADMIN — DEXTROSE MONOHYDRATE AND SODIUM CHLORIDE: 5; .45 INJECTION, SOLUTION INTRAVENOUS at 04:12

## 2017-12-27 RX ADMIN — CEFTRIAXONE SODIUM 2 G: 2 INJECTION, POWDER, FOR SOLUTION INTRAMUSCULAR; INTRAVENOUS at 04:12

## 2017-12-27 RX ADMIN — CEFPODOXIME PROXETIL 200 MG: 200 TABLET, FILM COATED ORAL at 12:12

## 2017-12-27 NOTE — PLAN OF CARE
Problem: SLP Goal  Goal: SLP Goal  Speech Language Pathology Goals  Goals expected to be met by 12/31  1. Pt will participate in ongoing swallow assessment to determine least restrictive diet.       Outcome: Ongoing (interventions implemented as appropriate)  Pt more able to readily accept and manage PO trials of thin liquids and puree.  Concerns for aspiration and ability to maintain nutrition/hydration orally remain due to decreased cognition.  Spoke to team and family regarding consideration pleasure feedings vs PO diet with risk of aspiration and decreased ability to maintain nutrition.  ELIECER Santiago, CCC-SLP  Speech Language Pathologist  [phone]  ,12/27/2017

## 2017-12-27 NOTE — PT/OT/SLP PROGRESS
Speech Language Pathology Treatment    Patient Name:  Margarita Castrejon   MRN:  [id number]  Admitting Diagnosis: Gram-negative bacteremia    Recommendations:                 General Recommendations:  Dysphagia therapy  Diet recommendations:  NPO (considering pleasure feedings of puree), Liquid Diet Level: NPO (considering pleasure feedings of thin liquids)   Aspiration Precautions: If pleasure feedings or PO diet is initiated, pt must be awake/alert to accept PO intake, positioned as upright as possible, given max cues to initiate swallow and ensure clearance of oral cavity as needed, and closely monitored for overt s/s of aspiration. Cease PO intake if pt not responsive to cues to clear oral cavity and/or swallow or if overt s/s of aspiration are present.  If oral meds are necessary, the safest method for administration if crushed and buried in puree with MAX cues to ensure swallow is initiated and oral cavity is cleared.  General Precautions: Standard, aspiration, fall, NPO  Communication strategies:  minimal communication; [place]    Subjective     Pt was nonverbal.  Daughter and granddaughters present for most of session.    Pain/Comfort:  · Pain Rating 1:  (no indications of pain)    Objective:     Has the patient been evaluated by SLP for swallowing?   Yes  Keep patient NPO? Yes   Current Respiratory Status:        Pt asleep upon entry, but rousable given verbal and tactile stimulation.  Pt tends to maintain a semi-fetal position turned on her right side with knees bent.  SLP attempted to achieve upright position for PO trials, but was limited due to pt's inability to sustain supine position before HOB is elevated.  Pt was ready to accept PO presentations, but initially did not achieve adequate suction from straw for sips of water despite max cues.  SLP presented pt with water via tsp with adequate oral and pharyngeal management. Subsequent straw sips of water and apple juice were accepted and managed efficiently with  no overt s/s of aspiration.  Pt also accepted 1/2 tsp bites of pudding with mildly impaired spoon stripping ability, but adequate for intake.  Oral transit and timing of pharyngeal swallow were adequate for remaining PO trials.  No overt s/s of aspiration were observed.  One of the physicians from the primary team entered during session. SLP discussed pt's improved acceptance and responsiveness to PO trials today, but continued concern that pt's cognitive status will limit pt's ability to adequately and safely maintain nutrition and hydration orally.  Despite pt exhibiting no overt s/s of aspiration during today's assessment, pt does remain at risk for aspiration due to cognitive status.  SLP discussed possible consideration of pleasure feedings vs PO diet of pureed consistencies and thin liquids with the understanding that pt will require max cues to ensure swallow was initiated and oral cavity was cleared.  Physician stated these considerations were be discussed with primary team before decision is made on recommendations regarding nutrition, as family is no longer considering alternative means of nutrition/hydration at this time.  Education was provided to pt's family regarding pt's progress, ongoing concern for aspiration and ability to maintain nutrition/hydration orally at this time due to cognition, possible recommendations for pleasure feedings vs PO diet with known risks, and strict aspiration precautions required if pt/family/medical team decide to allow pt to begin pleasure feedings or PO diet.  Good understanding was expressed by family members. Pt unable to demonstrate understanding at this time.    Assessment:     Margarita Castrejon is a 86 y.o. female with an SLP diagnosis of Dysphagia.      Goals:    SLP Goals        Problem: SLP Goal    Goal Priority Disciplines Outcome   SLP Goal     SLP Ongoing (interventions implemented as appropriate)   Description:  Speech Language Pathology Goals  Goals expected  to be met by 12/31  1. Pt will participate in ongoing swallow assessment to determine least restrictive diet.                        Plan:     · Patient to be seen:  5 x/week   · Plan of Care expires:  01/22/18  · Plan of Care reviewed with:  patient, daughter, grandchild(mary)   · SLP Follow-Up:  Yes       Discharge recommendations:   (pending progress)     Time Tracking:     SLP Treatment Date:   12/27/17  Speech Start Time:  0817  Speech Stop Time:  0842     Speech Total Time (min):  25 min    Billable Minutes: Treatment Swallowing Dysfunction 10 and Seld Care/Home Management Training 15    ELIECER Santiago, CCC-SLP  12/27/2017     ELIECER Santiago, CCC-SLP  Speech Language Pathologist  [phone]  12/27/2017

## 2017-12-27 NOTE — PLAN OF CARE
Problem: Patient Care Overview  Goal: Plan of Care Review  Outcome: Revised  POC reviewed with family who verbalized understanding. VSS. Patient would respond by voice but only with eyes - did not speak - Dr Fitzgerald aware. Remains free of falls and injury. IVF and abx infusing per MAR. Patient NPO and failed swallow study test this AM. No indicators of pain. Telemetry being monitored running paced NSR.  No acute events. No distress noted. Bed in lowest position, call light within reach, frequent rounds made for safety.

## 2017-12-27 NOTE — PROGRESS NOTES
[facility] Hospital Medicine  Progress Note    Patient Name: Margarita Castrejon  MRN: [id number]  Patient Class: IP- Inpatient   Admission Date: 12/23/2017  Length of Stay: 4 days  Attending Physician: Jasson Lawrence MD  Primary Care Provider: Patrick Landa MD    Hospital Medicine Team: [facility] HOSP MED 2 Clint Moser MD    Subjective:     Principal Problem:Gram-negative bacteremia    HPI:  Margarita Castrejon is a 86 y.o. with Alzheimers, CAD, CVA, hypertension, hyperlipidemia, CHF and SVT who presented to the [facility] emergency department from the nursing home where she resides with altered mental status.  Patient is currently non-verbal and the history was obtained from her daughter who brought her to the ED.  Her daughter reports that at her baseline she is able to stand with a walker/ aids, assist with dressing by aids, and is normally able to communicate verbally.  She also reports that she was hospitalized at [facility] for urosepsis.      Hospital Course:  Margarita Castrejon was admitted to internal medicine on 12/23/2017.  Patient was found to be hypernatremic and she was started on D5.  She was continued on Rocephin for presumed UTI.  Blood cx growing gram Neg rods. Na normalized at 144 and D5W was discontinued. However, Na trending up and pt NPO , so started on D5/.45NS 75cc/hr Palliative consulted for assistance with family goals of care discussion.     Interval History: Patient successful at swallowing juice with encouragement but remains for the most part unresponsive to verbal stimuli. Continuing to have [facility] discussions with family concerning antibiotic regimen and possibly hospice.     Review of Systems   Unable to perform ROS: Patient nonverbal     Objective:     Vital Signs (Most Recent):  Temp: 97.5 °F (36.4 °C) (12/27/17 0807)  Pulse: 60 (12/27/17 1122)  Resp: 18 (12/27/17 0807)  BP: (!) 116/56 (12/27/17 0807)  SpO2: 96 % (12/27/17 0807) Vital Signs (24h Range):  Temp:  [97.5 °F (36.4  °C)-98.1 °F (36.7 °C)] 97.5 °F (36.4 °C)  Pulse:  [60-76] 60  Resp:  [16-18] 18  SpO2:  [91 %-97 %] 96 %  BP: ()/(55-61) 116/56        There is no height or weight on file to calculate BMI.    Intake/Output Summary (Last 24 hours) at 12/27/17 1231  Last data filed at 12/27/17 0500   Gross per 24 hour   Intake             2700 ml   Output                0 ml   Net             2700 ml      Physical Exam   Constitutional:   86 year old female appearing stated age, nonverbal, does not appear to be in acute distress   HENT:   Head: Normocephalic and atraumatic.   Eyes: EOM are normal. Pupils are equal, round, and reactive to light. No scleral icterus.   Neck: Normal range of motion. Neck supple. No JVD present. No tracheal deviation present.   Cardiovascular: Normal rate, regular rhythm and normal heart sounds.  Exam reveals no gallop and no friction rub.    No murmur heard.  Pulmonary/Chest: Effort normal and breath sounds normal. No stridor. No respiratory distress. She has no wheezes. She has no rales.   Abdominal: Soft. She exhibits no distension and no mass. There is no guarding.   Musculoskeletal: She exhibits no edema or deformity.   Neurological:   Patient non-verbal. Opens eyes to verbal and tactile stimulation. Does not follow commands.   Skin: Skin is warm and dry.       Significant Labs:   Recent Results (from the past 24 hour(s))   Basic metabolic panel    Collection Time: 12/26/17  3:07 PM   Result Value Ref Range    Sodium 145 136 - 145 mmol/L    Potassium 3.7 3.5 - 5.1 mmol/L    Chloride 110 95 - 110 mmol/L    CO2 30 (H) 23 - 29 mmol/L    Glucose 101 70 - 110 mg/dL    BUN, Bld 40 (H) 8 - 23 mg/dL    Creatinine 1.6 (H) 0.5 - 1.4 mg/dL    Calcium 9.6 8.7 - 10.5 mg/dL    Anion Gap 5 (L) 8 - 16 mmol/L    eGFR if African American 33.4 (A) >60 mL/min/1.73 m^2    eGFR if non  29.0 (A) >60 mL/min/1.73 m^2   Basic metabolic panel    Collection Time: 12/26/17 11:33 PM   Result Value Ref Range     Sodium 145 136 - 145 mmol/L    Potassium 3.3 (L) 3.5 - 5.1 mmol/L    Chloride 109 95 - 110 mmol/L    CO2 29 23 - 29 mmol/L    Glucose 97 70 - 110 mg/dL    BUN, Bld 36 (H) 8 - 23 mg/dL    Creatinine 1.3 0.5 - 1.4 mg/dL    Calcium 9.3 8.7 - 10.5 mg/dL    Anion Gap 7 (L) 8 - 16 mmol/L    eGFR if African American 42.9 (A) >60 mL/min/1.73 m^2    eGFR if non  37.2 (A) >60 mL/min/1.73 m^2   Magnesium    Collection Time: 12/27/17  5:38 AM   Result Value Ref Range    Magnesium 2.1 1.6 - 2.6 mg/dL   Phosphorus    Collection Time: 12/27/17  5:38 AM   Result Value Ref Range    Phosphorus 1.9 (L) 2.7 - 4.5 mg/dL   CBC auto differential    Collection Time: 12/27/17  5:38 AM   Result Value Ref Range    WBC 8.77 3.90 - 12.70 K/uL    RBC 3.39 (L) 4.00 - 5.40 M/uL    Hemoglobin 10.2 (L) 12.0 - 16.0 g/dL    Hematocrit 31.9 (L) 37.0 - 48.5 %    MCV 94 82 - 98 fL    MCH 30.1 27.0 - 31.0 pg    MCHC 32.0 32.0 - 36.0 g/dL    RDW 15.8 (H) 11.5 - 14.5 %    Platelets 91 (L) 150 - 350 K/uL    MPV 10.8 9.2 - 12.9 fL    Immature Granulocytes 1.0 (H) 0.0 - 0.5 %    Gran # 6.2 1.8 - 7.7 K/uL    Immature Grans (Abs) 0.09 (H) 0.00 - 0.04 K/uL    Lymph # 1.5 1.0 - 4.8 K/uL    Mono # 0.8 0.3 - 1.0 K/uL    Eos # 0.2 0.0 - 0.5 K/uL    Baso # 0.03 0.00 - 0.20 K/uL    nRBC 0 0 /100 WBC    Gran% 70.7 38.0 - 73.0 %    Lymph% 16.5 (L) 18.0 - 48.0 %    Mono% 9.6 4.0 - 15.0 %    Eosinophil% 1.9 0.0 - 8.0 %    Basophil% 0.3 0.0 - 1.9 %    Differential Method Automated    Basic metabolic panel    Collection Time: 12/27/17  8:05 AM   Result Value Ref Range    Sodium 144 136 - 145 mmol/L    Potassium 3.3 (L) 3.5 - 5.1 mmol/L    Chloride 111 (H) 95 - 110 mmol/L    CO2 28 23 - 29 mmol/L    Glucose 108 70 - 110 mg/dL    BUN, Bld 32 (H) 8 - 23 mg/dL    Creatinine 1.3 0.5 - 1.4 mg/dL    Calcium 8.9 8.7 - 10.5 mg/dL    Anion Gap 5 (L) 8 - 16 mmol/L    eGFR if African American 42.9 (A) >60 mL/min/1.73 m^2    eGFR if non  37.2 (A) >60  mL/min/1.73 m^2           Assessment/Plan:      * Gram-negative bacteremia    -suspect 2/2 UTI  -UCx- positive for Klebsiella and Providenicia sensitive to Rocephin and Bactrim  -BCx positive for E. Coli that is pansensitive  -blood cx 12/24-NGTD  -Switched antibiotic course to PO Cefpodoxime 200 BID for 2 week course          Goals of care, counseling/discussion              Palliative care encounter              Acute encephalopathy    Patient reported to be interactive and verbal at baseline.  Over last week has become non-verbal.    -Etiology likely infection (UTI) and gram negative bacteremia  -Hypernatremia and hypercalcemia also probably contributing   -WIll continue to treat UTI and hypernatremia to assess for improvement  -Though unlikely condition could represent progression of dementia  -palliative consulted for assistance with goals of care discussion          Heart failure    Patients family reports recent diagnosis of heart failure.  No recent echocardiogram in records  -Will continue to hold lasix and ACEi in setting of LUCIEN  -Resume beta blocker when able           Hypercalcemia    Calcium 11.5 that corrects to 13.1 on admission; improving  -IVF  -regular BMP  -Will hold lasix for now given LUCIEN          SIRS (systemic inflammatory response syndrome)    Patient does not meet criteria for sepsis at this time.  SIRS 1/4 for elevated WBC count with likely source of infection being from the urinary tract.  --as under UTI          Hypernatremia    Sodium 152 upon admission.  Likely etiologies include impaired access to free water given her mental status and the continuation of lasix.  -Rehydration with D5 and 1/2 NS@75cc/hr with little improvement  -BMP q 8 to assess for the rate of correction  -Na 145 this AM.   -continue to monitor          Acute kidney injury superimposed on chronic kidney disease    Patient with history of chronic kidney disease with a baseline ~1.1.  Per nursing home records and  patients daughter she has had poor PO intake especially over the last week.  UA consistent with dehydration with 16 hyaline cast.  Another contributing factor could be her urinary tract infection.  -improving with IVFs  -avoid nephrotoxic agents          Acute cystitis without hematuria    Urinalysis positive with 78 WBC and many bacteria.  -Urine culture- Klebsiella and Providencia sensitive to rocephin and bactrm  -Blood cx- E coli that is pan sensitive  -Blood cx 12/24: NGTD  -Switched antibiotic course to PO Cefpodoxime 200 BID for 2 week course          Alzheimer's dementia    Normal home medications including donepezil and memantine   -Per ST pt to remain NPO  -Resume medications when patient safely tolerating oral intake  - will discuss NG tube placement with family, family currently stating patient would not want an NG tube.   -Patient is able to swallow, however continues to require encouragement and interaction for assistance given poor mentation.         Hypercholesteremia              Coronary artery disease    Holding  aspirin 81mg currently given difficulty swallowing          Hypertension    Continue amlodipine when able to take PO intake  Currently BP at goal        Sick sinus syndrome    S/p pace maker placement            VTE Risk Mitigation         Ordered     High Risk of VTE  Once      12/23/17 2058     Place sequential compression device  Until discontinued      12/23/17 1657        Dispo: Will continue to have discussions concerning placement of patient in NH/SNF vs Hospice care.     Clint Moser MD  Department of Hospital Medicine   [facility]

## 2017-12-27 NOTE — PLAN OF CARE
Problem: Fall Risk (Adult)  Goal: Identify Related Risk Factors and Signs and Symptoms  Related risk factors and signs and symptoms are identified upon initiation of Human Response Clinical Practice Guideline (CPG)   Outcome: Ongoing (interventions implemented as appropriate)  Reviewed POC with pts daughter Maggy. Discussed fall precautions. Family verbalized understanding.

## 2017-12-27 NOTE — ASSESSMENT & PLAN NOTE
Sodium 152 upon admission.  Likely etiologies include impaired access to free water given her mental status and the continuation of lasix.  -Rehydration with D5 and 1/2 NS@75cc/hr with little improvement  -BMP q 8 to assess for the rate of correction  -Na 145 this AM.   -continue to monitor

## 2017-12-27 NOTE — PLAN OF CARE
Problem: Occupational Therapy Goal  Goal: Occupational Therapy Goal  Goals to be met by: 1/3/17    Patient will increase functional independence with ADLs by performing:    UE Dressing with Moderate Assistance.  Grooming while seated with Moderate Assistance.  Supine to sit with Maximum Assistance.  Stand pivot transfer with max Assistance.  Upper extremity exercise program per handout, with CG independence.  CG demo understanding of positioning while supine in bed.     Outcome: Ongoing (interventions implemented as appropriate)  Evaluation completed. Continue with POC.   Paula carney OT  12/27/2017

## 2017-12-27 NOTE — ASSESSMENT & PLAN NOTE
Normal home medications including donepezil and memantine   -Per ST pt to remain NPO  -Resume medications when patient safely tolerating oral intake  - will discuss NG tube placement with family, family currently stating patient would not want an NG tube.   -Patient is able to swallow, however continues to require encouragement and interaction for assistance given poor mentation.

## 2017-12-27 NOTE — PLAN OF CARE
AVA assigned to case today 12/27/2017. AVA will assist team with DC needs. AVA in communication with [facility].    CM notified AVA that team would like patient to discharge back to [facility] with hospice.     AVA called the pt's daughter, Maggy, to discuss the plan. Maggy stated that returning to [facility] is not their plan. She stated that they spoke to palliative care yesterday and today. Maggy stated they are going to look at [facility] today and will call AVA when they are finished. They stated that they want to see if the pt qualifies for inpatient hospice. AVA explained the process of inpatient hospice and that AVA would send referral, a liaison would come to assess the pt and answer any questions the family may have.  Maggy stated that once they visit [facility] they will call SW will a company they choose to come assess the pt to see if she qualifies for hospice.   Maggy stated that the pt has many children, but no one is POA, and the children have been speaking together to form a plan.     UPDATE 4:47 PM  AVA received call from pt's daughter, Maggy, who stated that they visited [facility] and were very pleased. Maggy stated that [facility] told her that someone would be able to evaluate the pt at the hospital tomorrow (thursday). Maggy asked that AVA send referral to [facility]. Maggy stated that the pt';s candice will be at the [facility] tomorrow after 9am. The aughyinka is Darcy and her contact number is [phone].      AVA sent referral via [facility] to [facility].    Skyla De Souza, AVA  [id number]

## 2017-12-27 NOTE — PHARMACY MED REC
Admission Medication Reconciliation - Pharmacy Consult Note    The home medication history was taken by Tg Danielle, Pharmacy Tech.     Patient currently NPO, family considering hospice options.  Will continue to hold home meds (listed below) at this time until patient can safely take them    Troy Lackey, PharmD  [id number]      Patient's prior to admission medication regimen was as follows:  Prescriptions Prior to Admission   Medication Sig Dispense Refill Last Dose    albuterol-ipratropium 2.5mg-0.5mg/3mL (DUO-NEB) 0.5 mg-3 mg(2.5 mg base)/3 mL nebulizer solution Take 3 mLs by nebulization every 4 (four) hours as needed for Wheezing or Shortness of Breath. Rescue       alendronate (FOSAMAX) 70 MG tablet Take 1 tablet (70 mg total) by mouth every 7 days. 1 Tablet Oral Weekly (Patient taking differently: Take 70 mg by mouth every Saturday. ) 13 tablet 4 Past Week    amlodipine (NORVASC) 5 MG tablet Take 1 tablet (5 mg total) by mouth once daily. 90 tablet 4 12/23/2017    aspirin (ECOTRIN) 81 MG EC tablet Take 81 mg by mouth once.    12/23/2017    CALCIUM CARBONATE/VITAMIN D3 (CALCIUM 600 + D,3, ORAL) Take 1 tablet by mouth 2 (two) times daily.    12/23/2017    donepezil (ARICEPT) 10 MG tablet Take 1 tablet (10 mg total) by mouth once daily. 1 Tablet Oral Every day (Patient taking differently: Take 10 mg by mouth once daily. ) 90 tablet 4 12/22/2017    escitalopram oxalate (LEXAPRO) 10 MG tablet Take 1 tablet (10 mg total) by mouth once daily. 90 tablet 4 12/23/2017    furosemide (LASIX) 20 MG tablet Take 20 mg by mouth 2 (two) times daily.    12/23/2017    guaifenesin (MUCINEX) 600 mg 12 hr tablet Take 600 mg by mouth 2 (two) times daily as needed (cough).    12/22/2017    lisinopril (PRINIVIL,ZESTRIL) 5 MG tablet Take 1 tablet (5 mg total) by mouth once daily. 90 tablet 4 12/23/2017    memantine (NAMENDA) 10 MG Tab Take 1 tablet (10 mg total) by mouth 2 (two) times daily. 180 tablet 4 12/23/2017    metoprolol  succinate (TOPROL-XL) 50 MG 24 hr tablet Take 1 tablet (50 mg total) by mouth once daily. 90 tablet 4 12/23/2017    potassium chloride SA (K-DUR,KLOR-CON) 20 MEQ tablet Take 1 tablet (20 mEq total) by mouth once daily. 90 tablet 4 12/23/2017    trazodone (DESYREL) 50 MG tablet Take 1 tablet (50 mg total) by mouth every evening. 90 tablet 4 12/22/2017         Please add appropriate    SmartPhrase below:

## 2017-12-27 NOTE — ASSESSMENT & PLAN NOTE
Urinalysis positive with 78 WBC and many bacteria.  -Urine culture- Klebsiella and Providencia sensitive to rocephin and bactrm  -Blood cx- E coli that is pan sensitive  -Blood cx 12/24: NGTD  -Switched antibiotic course to PO Cefpodoxime 200 BID for 2 week course

## 2017-12-27 NOTE — ASSESSMENT & PLAN NOTE
-suspect 2/2 UTI  -UCx- positive for Klebsiella and Providenicia sensitive to Rocephin and Bactrim  -BCx positive for E. Coli that is pansensitive  -blood cx 12/24-NGTD  -Switched antibiotic course to PO Cefpodoxime 200 BID for 2 week course

## 2017-12-27 NOTE — SUBJECTIVE & OBJECTIVE
Interval History: Patient successful at swallowing juice with encouragement but remains for the most part unresponsive to verbal stimuli. Continuing to have GOC discussions with family concerning antibiotic regimen and possibly hospice.     Review of Systems   Unable to perform ROS: Patient nonverbal     Objective:     Vital Signs (Most Recent):  Temp: 97.5 °F (36.4 °C) (12/27/17 0807)  Pulse: 60 (12/27/17 1122)  Resp: 18 (12/27/17 0807)  BP: (!) 116/56 (12/27/17 0807)  SpO2: 96 % (12/27/17 0807) Vital Signs (24h Range):  Temp:  [97.5 °F (36.4 °C)-98.1 °F (36.7 °C)] 97.5 °F (36.4 °C)  Pulse:  [60-76] 60  Resp:  [16-18] 18  SpO2:  [91 %-97 %] 96 %  BP: ()/(55-61) 116/56        There is no height or weight on file to calculate BMI.    Intake/Output Summary (Last 24 hours) at 12/27/17 1231  Last data filed at 12/27/17 0500   Gross per 24 hour   Intake             2700 ml   Output                0 ml   Net             2700 ml      Physical Exam   Constitutional:   86 year old female appearing stated age, nonverbal, does not appear to be in acute distress   HENT:   Head: Normocephalic and atraumatic.   Eyes: EOM are normal. Pupils are equal, round, and reactive to light. No scleral icterus.   Neck: Normal range of motion. Neck supple. No JVD present. No tracheal deviation present.   Cardiovascular: Normal rate, regular rhythm and normal heart sounds.  Exam reveals no gallop and no friction rub.    No murmur heard.  Pulmonary/Chest: Effort normal and breath sounds normal. No stridor. No respiratory distress. She has no wheezes. She has no rales.   Abdominal: Soft. She exhibits no distension and no mass. There is no guarding.   Musculoskeletal: She exhibits no edema or deformity.   Neurological:   Patient non-verbal. Opens eyes to verbal and tactile stimulation. Does not follow commands.   Skin: Skin is warm and dry.       Significant Labs:   Recent Results (from the past 24 hour(s))   Basic metabolic panel     Collection Time: 12/26/17  3:07 PM   Result Value Ref Range    Sodium 145 136 - 145 mmol/L    Potassium 3.7 3.5 - 5.1 mmol/L    Chloride 110 95 - 110 mmol/L    CO2 30 (H) 23 - 29 mmol/L    Glucose 101 70 - 110 mg/dL    BUN, Bld 40 (H) 8 - 23 mg/dL    Creatinine 1.6 (H) 0.5 - 1.4 mg/dL    Calcium 9.6 8.7 - 10.5 mg/dL    Anion Gap 5 (L) 8 - 16 mmol/L    eGFR if African American 33.4 (A) >60 mL/min/1.73 m^2    eGFR if non  29.0 (A) >60 mL/min/1.73 m^2   Basic metabolic panel    Collection Time: 12/26/17 11:33 PM   Result Value Ref Range    Sodium 145 136 - 145 mmol/L    Potassium 3.3 (L) 3.5 - 5.1 mmol/L    Chloride 109 95 - 110 mmol/L    CO2 29 23 - 29 mmol/L    Glucose 97 70 - 110 mg/dL    BUN, Bld 36 (H) 8 - 23 mg/dL    Creatinine 1.3 0.5 - 1.4 mg/dL    Calcium 9.3 8.7 - 10.5 mg/dL    Anion Gap 7 (L) 8 - 16 mmol/L    eGFR if African American 42.9 (A) >60 mL/min/1.73 m^2    eGFR if non  37.2 (A) >60 mL/min/1.73 m^2   Magnesium    Collection Time: 12/27/17  5:38 AM   Result Value Ref Range    Magnesium 2.1 1.6 - 2.6 mg/dL   Phosphorus    Collection Time: 12/27/17  5:38 AM   Result Value Ref Range    Phosphorus 1.9 (L) 2.7 - 4.5 mg/dL   CBC auto differential    Collection Time: 12/27/17  5:38 AM   Result Value Ref Range    WBC 8.77 3.90 - 12.70 K/uL    RBC 3.39 (L) 4.00 - 5.40 M/uL    Hemoglobin 10.2 (L) 12.0 - 16.0 g/dL    Hematocrit 31.9 (L) 37.0 - 48.5 %    MCV 94 82 - 98 fL    MCH 30.1 27.0 - 31.0 pg    MCHC 32.0 32.0 - 36.0 g/dL    RDW 15.8 (H) 11.5 - 14.5 %    Platelets 91 (L) 150 - 350 K/uL    MPV 10.8 9.2 - 12.9 fL    Immature Granulocytes 1.0 (H) 0.0 - 0.5 %    Gran # 6.2 1.8 - 7.7 K/uL    Immature Grans (Abs) 0.09 (H) 0.00 - 0.04 K/uL    Lymph # 1.5 1.0 - 4.8 K/uL    Mono # 0.8 0.3 - 1.0 K/uL    Eos # 0.2 0.0 - 0.5 K/uL    Baso # 0.03 0.00 - 0.20 K/uL    nRBC 0 0 /100 WBC    Gran% 70.7 38.0 - 73.0 %    Lymph% 16.5 (L) 18.0 - 48.0 %    Mono% 9.6 4.0 - 15.0 %    Eosinophil% 1.9  0.0 - 8.0 %    Basophil% 0.3 0.0 - 1.9 %    Differential Method Automated    Basic metabolic panel    Collection Time: 12/27/17  8:05 AM   Result Value Ref Range    Sodium 144 136 - 145 mmol/L    Potassium 3.3 (L) 3.5 - 5.1 mmol/L    Chloride 111 (H) 95 - 110 mmol/L    CO2 28 23 - 29 mmol/L    Glucose 108 70 - 110 mg/dL    BUN, Bld 32 (H) 8 - 23 mg/dL    Creatinine 1.3 0.5 - 1.4 mg/dL    Calcium 8.9 8.7 - 10.5 mg/dL    Anion Gap 5 (L) 8 - 16 mmol/L    eGFR if African American 42.9 (A) >60 mL/min/1.73 m^2    eGFR if non  37.2 (A) >60 mL/min/1.73 m^2

## 2017-12-27 NOTE — PLAN OF CARE
Problem: Patient Care Overview  Goal: Plan of Care Review  Outcome: Ongoing (interventions implemented as appropriate)  Discussed plan of care with the patient and her family, including medication given.  IV antibiotics restarted.    Problem: Fall Risk (Adult)  Goal: Identify Related Risk Factors and Signs and Symptoms  Related risk factors and signs and symptoms are identified upon initiation of Human Response Clinical Practice Guideline (CPG)   Outcome: Ongoing (interventions implemented as appropriate)  Patient confined to the bed, requires assistance with toileting.  Goal: Absence of Falls  Patient will demonstrate the desired outcomes by discharge/transition of care.   Outcome: Ongoing (interventions implemented as appropriate)  Patient has been free from falls this shift, will continue to monitor throughout this hospitalization.    Problem: Pressure Ulcer Risk (Ney Scale) (Adult,Obstetrics,Pediatric)  Goal: Identify Related Risk Factors and Signs and Symptoms  Related risk factors and signs and symptoms are identified upon initiation of Human Response Clinical Practice Guideline (CPG)   Outcome: Ongoing (interventions implemented as appropriate)  Patient turned skin assessed.

## 2017-12-27 NOTE — PT/OT/SLP EVAL
"Occupational Therapy   Evaluation    Name: Margarita Castrejon  MRN: [id number]  Admitting Diagnosis:  Gram-negative bacteremia      Recommendations:     Discharge Recommendations:  (SNF in NH)  Discharge Equipment Recommendations:  none  Barriers to discharge:  None    History:     Occupational Profile:  Living Environment: Pt resides in a NH with family that near by.   Previous level of function: PTA, family reports nursing was providing assistance with all ADL and functional mobility. Family reports she would occasionally walk using RW however primarily using wheelchair for functional mobility.   Equipment Owned:   W/c, RW  Assistance upon Discharge: Family support live nearby     Past Medical History:   Diagnosis Date    Alzheimer disease     Coronary artery disease 6/28/2013 1993. CABG x 3. EJ.    CVA (cerebrovascular accident) 6/28/2013 2009.    Dementia     Depression     GERD (gastroesophageal reflux disease)     Heart failure, diastolic, acute on chronic 9/19/2016    820/2016: CXR: Mild CHF. Minimal effusions.    HTN (hypertension)     Hypercholesteremia 6/28/2013    Hypertension, benign 6/28/2013    Diagnosed 1990.    Osteoporosis, unspecified     Pacemaker 6/28/2013    10/22/2012: [facility]. Merlin not on because she is at NH.    Renal disorder     SSS (sick sinus syndrome) 6/28/2013    10/22/2012: [facility].    Supraventricular tachycardia 11/18/2014       Past Surgical History:   Procedure Laterality Date    HERNIA REPAIR      kidney surgery         Subjective     Chief Complaint: " I'm thirsty"   Communicated with: RN prior to session.  Pain/Comfort:  · Pain Rating 1: 0/10  · Pain Rating Post-Intervention 1: 0/10    Objective:     Patient found with: telemetry, SCD, peripheral IV, found sleeping comfortably     General Precautions: Standard, aspiration, fall, NPO   Orthopedic Precautions:N/A   Braces: N/A     Occupational Performance:    Bed Mobility:    · Patient " "completed Supine to Sit with dependent  · Patient completed Sit to Supine with total assistance    Activities of Daily Living:  · Grooming: min A to wash face alternating between BUE  · UB Dressing: maximal assistance to sruthi gown like jacket while seated EOB    Cognitive/Visual Perceptual:  Cognitive/Psychosocial Skills:     -       Oriented to: Person, Place, Time and Situation   -       Follows Commands/attention:Follows multistep  commands  -       Communication: able to communicate needs; however limited interation  -       Memory: dementia; during session pt reported " why am I here"   -       Safety awareness/insight to disability: impaired   -       Mood/Affect/Coping skills/emotional control: Somnolent   Visual/Perceptual:      -Intact    Physical Exam:  Postural examination/scapula alignment:    -       Rounded shoulders  -       Forward head  -       Posterior pelvic tilt  Skin integrity: Visible skin intact  Edema:  None noted  Sensation:    -       Intact  Motor Planning:    -       intact  Dominant hand:    -       Right  Upper Extremity Range of Motion:     -       Right Upper Extremity: WFL  -       Left Upper Extremity: WFL  Upper Extremity Strength:    -       Right Upper Extremity: WFL 3+/5  -       Left Upper Extremity: WFL  3+/5   Strength:    -       Right Upper Extremity: WFL  -       Left Upper Extremity: WFL  Fine Motor Coordination:    -       Intact    Patient left with bed in chair position with all lines intact, call button in reach and RN notified    AMPA 6 Click:  AMPA Total Score: 10    Treatment & Education:  -Pt edu on OT role/POC  -Importance of OOB activity with staff assistance  -Safety during functional t/f and mobility  - Communication board updated  - Family edu on positioning; pt left in chair position, cervical spine in neutral, BUE elevated, wedge placed on R side of pt to promote trunk in midline  - Family edu on BUE/BLE HEP-- given handout     Pt following 50% of " "simple one-step commands  Pt tolerated sitting EOB ~10 minutes with CGA for postural control   Repeated cues to maintain eyes open   Verbalized minimally throughout session    Education:    Assessment:     Margarita Castrejon is a 86 y.o. female with a medical diagnosis of Gram-negative bacteremia.  She presents somnolent with complaints of being thirst throughout session. Performance deficits affecting function are weakness, impaired endurance, impaired self care skills, impaired functional mobilty, impaired balance, impaired cognition.      Rehab Prognosis:  Fair; patient would benefit from acute skilled OT services to address these deficits and reach maximum level of function.         Clinical Decision Makin.  OT Mod:  "Pt evaluation falls under moderate complexity for evaluation coding due to identification of 3-5 performance deficits noted as stated above. Eval required Min/Mod assistance to complete on this date and detailed assessment(s) were utilized. Moreover, an expanded review of history and occupational profile obtained with additional review of cognitive, physical and psychosocial hx."     Plan:     Patient to be seen 3 x/week to address the above listed problems via self-care/home management, therapeutic activities, therapeutic exercises, neuromuscular re-education, cognitive retraining  · Plan of Care Expires: 18  · Plan of Care Reviewed with:      This Plan of care has been discussed with the patient who was involved in its development and understands and is in agreement with the identified goals and treatment plan    GOALS:    Occupational Therapy Goals        Problem: Occupational Therapy Goal    Goal Priority Disciplines Outcome Interventions   Occupational Therapy Goal     OT, PT/OT Ongoing (interventions implemented as appropriate)    Description:  Goals to be met by: 1/3/17    Patient will increase functional independence with ADLs by performing:    UE Dressing with Moderate " Assistance.  Grooming while seated with Moderate Assistance.  Supine to sit with Maximum Assistance.  Stand pivot transfer with max Assistance.  Upper extremity exercise program per handout, with CG independence.  CG demo understanding of positioning while supine in bed.                       Time Tracking:     OT Date of Treatment: 12/27/17  OT Start Time: 0907  OT Stop Time: 0938  OT Total Time (min): 31 min    Billable Minutes:Evaluation 20  Therapeutic Activity 11    Paula carney OT  12/27/2017

## 2017-12-28 VITALS
HEART RATE: 71 BPM | RESPIRATION RATE: 16 BRPM | DIASTOLIC BLOOD PRESSURE: 59 MMHG | OXYGEN SATURATION: 96 % | SYSTOLIC BLOOD PRESSURE: 120 MMHG | TEMPERATURE: 98 F

## 2017-12-28 PROBLEM — R65.10 SIRS (SYSTEMIC INFLAMMATORY RESPONSE SYNDROME): Status: RESOLVED | Noted: 2017-12-27 | Resolved: 2017-12-28

## 2017-12-28 PROBLEM — E87.0 HYPERNATREMIA: Status: RESOLVED | Noted: 2017-12-23 | Resolved: 2017-12-28

## 2017-12-28 PROBLEM — E83.52 HYPERCALCEMIA: Status: RESOLVED | Noted: 2017-12-23 | Resolved: 2017-12-28

## 2017-12-28 LAB
ANION GAP SERPL CALC-SCNC: 9 MMOL/L
BACTERIA BLD CULT: NORMAL
BASOPHILS # BLD AUTO: 0.04 K/UL
BASOPHILS NFR BLD: 0.4 %
BUN SERPL-MCNC: 26 MG/DL
CALCIUM SERPL-MCNC: 8.9 MG/DL
CHLORIDE SERPL-SCNC: 109 MMOL/L
CO2 SERPL-SCNC: 24 MMOL/L
CREAT SERPL-MCNC: 1.2 MG/DL
DIFFERENTIAL METHOD: ABNORMAL
EOSINOPHIL # BLD AUTO: 0.1 K/UL
EOSINOPHIL NFR BLD: 1.1 %
ERYTHROCYTE [DISTWIDTH] IN BLOOD BY AUTOMATED COUNT: 15.5 %
EST. GFR  (AFRICAN AMERICAN): 47.3 ML/MIN/1.73 M^2
EST. GFR  (NON AFRICAN AMERICAN): 41 ML/MIN/1.73 M^2
GLUCOSE SERPL-MCNC: 88 MG/DL
HCT VFR BLD AUTO: 31.7 %
HGB BLD-MCNC: 10.2 G/DL
IMM GRANULOCYTES # BLD AUTO: 0.09 K/UL
IMM GRANULOCYTES NFR BLD AUTO: 1 %
LYMPHOCYTES # BLD AUTO: 1.7 K/UL
LYMPHOCYTES NFR BLD: 18.4 %
MAGNESIUM SERPL-MCNC: 2.1 MG/DL
MCH RBC QN AUTO: 31.1 PG
MCHC RBC AUTO-ENTMCNC: 32.2 G/DL
MCV RBC AUTO: 97 FL
MONOCYTES # BLD AUTO: 0.8 K/UL
MONOCYTES NFR BLD: 8.5 %
NEUTROPHILS # BLD AUTO: 6.5 K/UL
NEUTROPHILS NFR BLD: 70.6 %
NRBC BLD-RTO: 0 /100 WBC
PHOSPHATE SERPL-MCNC: 1.5 MG/DL
PLATELET # BLD AUTO: 91 K/UL
PMV BLD AUTO: 11.7 FL
POTASSIUM SERPL-SCNC: 3.6 MMOL/L
RBC # BLD AUTO: 3.28 M/UL
SODIUM SERPL-SCNC: 142 MMOL/L
WBC # BLD AUTO: 9.16 K/UL

## 2017-12-28 PROCEDURE — 84100 ASSAY OF PHOSPHORUS: CPT

## 2017-12-28 PROCEDURE — 99239 HOSP IP/OBS DSCHRG MGMT >30: CPT | Mod: GC,,, | Performed by: HOSPITALIST

## 2017-12-28 PROCEDURE — 92526 ORAL FUNCTION THERAPY: CPT

## 2017-12-28 PROCEDURE — 83735 ASSAY OF MAGNESIUM: CPT

## 2017-12-28 PROCEDURE — 85025 COMPLETE CBC W/AUTO DIFF WBC: CPT

## 2017-12-28 PROCEDURE — 25000003 PHARM REV CODE 250: Performed by: STUDENT IN AN ORGANIZED HEALTH CARE EDUCATION/TRAINING PROGRAM

## 2017-12-28 PROCEDURE — 36415 COLL VENOUS BLD VENIPUNCTURE: CPT

## 2017-12-28 RX ORDER — POTASSIUM CHLORIDE 20 MEQ/1
20 TABLET, EXTENDED RELEASE ORAL DAILY
Status: DISCONTINUED | OUTPATIENT
Start: 2017-12-28 | End: 2017-12-28

## 2017-12-28 RX ORDER — CEFPODOXIME PROXETIL 200 MG/1
200 TABLET, FILM COATED ORAL EVERY 12 HOURS
Qty: 28 TABLET | Refills: 0 | Status: SHIPPED | OUTPATIENT
Start: 2017-12-28 | End: 2018-01-11

## 2017-12-28 RX ORDER — LISINOPRIL 5 MG/1
5 TABLET ORAL DAILY
Qty: 90 TABLET | Refills: 3 | Status: SHIPPED | OUTPATIENT
Start: 2017-12-28 | End: 2018-12-28

## 2017-12-28 RX ORDER — MEMANTINE HYDROCHLORIDE 10 MG/1
10 TABLET ORAL 2 TIMES DAILY
Status: DISCONTINUED | OUTPATIENT
Start: 2017-12-28 | End: 2017-12-28

## 2017-12-28 RX ORDER — TRAZODONE HYDROCHLORIDE 50 MG/1
50 TABLET ORAL NIGHTLY
Status: DISCONTINUED | OUTPATIENT
Start: 2017-12-28 | End: 2017-12-28

## 2017-12-28 RX ORDER — DONEPEZIL HYDROCHLORIDE 10 MG/1
10 TABLET, FILM COATED ORAL DAILY
Qty: 30 TABLET | Refills: 11 | Status: SHIPPED | OUTPATIENT
Start: 2017-12-28 | End: 2018-12-28

## 2017-12-28 RX ORDER — MEMANTINE HYDROCHLORIDE 10 MG/1
10 TABLET ORAL 2 TIMES DAILY
Qty: 60 TABLET | Refills: 11 | Status: SHIPPED | OUTPATIENT
Start: 2017-12-28 | End: 2018-12-28

## 2017-12-28 RX ORDER — METOPROLOL SUCCINATE 50 MG/1
50 TABLET, EXTENDED RELEASE ORAL DAILY
Status: DISCONTINUED | OUTPATIENT
Start: 2017-12-28 | End: 2017-12-28

## 2017-12-28 RX ORDER — METOPROLOL SUCCINATE 50 MG/1
50 TABLET, EXTENDED RELEASE ORAL DAILY
Qty: 30 TABLET | Refills: 11 | Status: SHIPPED | OUTPATIENT
Start: 2017-12-28 | End: 2018-12-28

## 2017-12-28 RX ORDER — POTASSIUM CHLORIDE 20 MEQ/1
20 TABLET, EXTENDED RELEASE ORAL DAILY
Qty: 60 TABLET | Refills: 11 | Status: SHIPPED | OUTPATIENT
Start: 2017-12-28

## 2017-12-28 RX ORDER — TRAZODONE HYDROCHLORIDE 50 MG/1
50 TABLET ORAL NIGHTLY
Qty: 30 TABLET | Refills: 11 | Status: SHIPPED | OUTPATIENT
Start: 2017-12-28 | End: 2018-12-28

## 2017-12-28 RX ORDER — ASPIRIN 81 MG/1
81 TABLET ORAL ONCE
Refills: 0 | COMMUNITY
Start: 2017-12-28 | End: 2017-12-28

## 2017-12-28 RX ORDER — ESCITALOPRAM OXALATE 10 MG/1
10 TABLET ORAL DAILY
Status: DISCONTINUED | OUTPATIENT
Start: 2017-12-28 | End: 2017-12-28

## 2017-12-28 RX ORDER — AMLODIPINE BESYLATE 5 MG/1
5 TABLET ORAL DAILY
Status: DISCONTINUED | OUTPATIENT
Start: 2017-12-28 | End: 2017-12-28

## 2017-12-28 RX ORDER — FUROSEMIDE 20 MG/1
20 TABLET ORAL 2 TIMES DAILY
Qty: 60 TABLET | Refills: 11 | Status: SHIPPED | OUTPATIENT
Start: 2017-12-28 | End: 2018-12-28

## 2017-12-28 RX ORDER — FUROSEMIDE 20 MG/1
20 TABLET ORAL 2 TIMES DAILY
Status: DISCONTINUED | OUTPATIENT
Start: 2017-12-28 | End: 2017-12-28

## 2017-12-28 RX ORDER — CALCIUM CARBONATE/VITAMIN D3 600MG-5MCG
1 TABLET ORAL 2 TIMES DAILY
Status: DISCONTINUED | OUTPATIENT
Start: 2017-12-28 | End: 2017-12-28

## 2017-12-28 RX ORDER — CEFPODOXIME PROXETIL 200 MG/1
200 TABLET, FILM COATED ORAL EVERY 12 HOURS
Qty: 14 TABLET | Refills: 0 | Status: SHIPPED | OUTPATIENT
Start: 2017-12-28 | End: 2017-12-28

## 2017-12-28 RX ORDER — CALCIUM CARBONATE/VITAMIN D3 600MG-5MCG
1 TABLET ORAL 2 TIMES DAILY
Refills: 0 | COMMUNITY
Start: 2017-12-28 | End: 2018-12-28

## 2017-12-28 RX ORDER — ASPIRIN 81 MG/1
81 TABLET ORAL ONCE
Status: DISCONTINUED | OUTPATIENT
Start: 2017-12-28 | End: 2017-12-28

## 2017-12-28 RX ORDER — CEFPODOXIME PROXETIL 200 MG/1
200 TABLET, FILM COATED ORAL EVERY 12 HOURS
Status: DISCONTINUED | OUTPATIENT
Start: 2017-12-28 | End: 2017-12-28 | Stop reason: HOSPADM

## 2017-12-28 RX ORDER — AMLODIPINE BESYLATE 5 MG/1
5 TABLET ORAL DAILY
Qty: 30 TABLET | Refills: 11 | Status: SHIPPED | OUTPATIENT
Start: 2017-12-28 | End: 2018-12-28

## 2017-12-28 RX ORDER — DONEPEZIL HYDROCHLORIDE 10 MG/1
10 TABLET, FILM COATED ORAL DAILY
Status: DISCONTINUED | OUTPATIENT
Start: 2017-12-28 | End: 2017-12-28

## 2017-12-28 RX ORDER — ESCITALOPRAM OXALATE 10 MG/1
10 TABLET ORAL DAILY
Qty: 30 TABLET | Refills: 11 | Status: SHIPPED | OUTPATIENT
Start: 2017-12-28 | End: 2018-12-28

## 2017-12-28 RX ORDER — LISINOPRIL 5 MG/1
5 TABLET ORAL DAILY
Status: DISCONTINUED | OUTPATIENT
Start: 2017-12-28 | End: 2017-12-28

## 2017-12-28 RX ADMIN — CEFPODOXIME PROXETIL 200 MG: 200 TABLET, FILM COATED ORAL at 11:12

## 2017-12-28 NOTE — PLAN OF CARE
Problem: SLP Goal  Goal: SLP Goal  Speech Language Pathology Goals  Goals expected to be met by 12/31  1. Pt will participate in ongoing swallow assessment to determine least restrictive diet.       Outcome: Ongoing (interventions implemented as appropriate)  Noted to orders clear liquid diet for pt.  Family reports limited PO intake, inconsistent tolerance, and max cues needed to initiate swallow.  Per family, pt discharging to hospice care today.  ELIECER Santiago, CCC-SLP  Speech Language Pathologist  [phone]  12/28/2017

## 2017-12-28 NOTE — ASSESSMENT & PLAN NOTE
Urinalysis positive with 78 WBC and many bacteria.  -Urine culture- Klebsiella and Providencia sensitive to rocephin and bactrm  -Blood cx- E coli that is pan sensitive  -Blood cx 12/24: NGTD  -Switched antibiotic course to PO Cefpodoxime 200 BID for 2 week course.

## 2017-12-28 NOTE — PLAN OF CARE
Hospice certification of terminal illness faxed to Claudia at [facility] ([phone]).    Maria Antonia Graves RN  Ext [phone]

## 2017-12-28 NOTE — PLAN OF CARE
Problem: Patient Care Overview  Goal: Plan of Care Review  Outcome: Outcome(s) achieved Date Met: 12/28/17  Discussed plan of care with the patient, including medication given.  Patient discharged to inpatient hospice facility.    Problem: Fall Risk (Adult)  Goal: Identify Related Risk Factors and Signs and Symptoms  Related risk factors and signs and symptoms are identified upon initiation of Human Response Clinical Practice Guideline (CPG)   Outcome: Outcome(s) achieved Date Met: 12/28/17  Patient is confined to the bed and requires assistance with transfers and ADL's.  Goal: Absence of Falls  Patient will demonstrate the desired outcomes by discharge/transition of care.   Outcome: Outcome(s) achieved Date Met: 12/28/17  Patient has been free from falls this hospitalization.

## 2017-12-28 NOTE — PLAN OF CARE
AVA assigned to case today 12/28/2017. AVA will assist team with DC needs. AVA in communication with CM.    Claudia from [facility] called AVA and stated that she is on her way to meet with the family.     The family completed all the paperwork with Claudia.     AVA faxed orders to [facility] via [employer].     Claudia called AVA and stated that the orders are good and they can admit the pt.     Skyla De Souza, AVA  [phone]

## 2017-12-28 NOTE — PROGRESS NOTES
Patient discharged to an inpatient hospice facility.  Report called to Manju at [facility].  Family present upon transfer.

## 2017-12-28 NOTE — SUBJECTIVE & OBJECTIVE
"Interval History: Patient's PIV fell out last night and may need to be replaced depending on course of action going forward. Per family, patient is a lot more interactive this morning: holding brief conversation, talking on the phone, following commands, and repeating back colors appropriately.     Review of Systems   Unable to perform ROS: Patient nonverbal     Objective:     Vital Signs (Most Recent):  Temp: 97.6 °F (36.4 °C) (12/28/17 0725)  Pulse: 62 (12/28/17 0748)  Resp: 18 (12/28/17 0725)  BP: (!) 100/51 (12/28/17 0725)  SpO2: 98 % (12/28/17 0725) Vital Signs (24h Range):  Temp:  [97.6 °F (36.4 °C)-98.5 °F (36.9 °C)] 97.6 °F (36.4 °C)  Pulse:  [60-81] 62  Resp:  [17-20] 18  SpO2:  [94 %-98 %] 98 %  BP: ()/(51-70) 100/51        There is no height or weight on file to calculate BMI.    Intake/Output Summary (Last 24 hours) at 12/28/17 0858  Last data filed at 12/27/17 1830   Gross per 24 hour   Intake               60 ml   Output                0 ml   Net               60 ml      Physical Exam   Constitutional:   86 year old female appearing stated age, does not appear to be in acute distress   HENT:   Head: Normocephalic and atraumatic.   Eyes: EOM are normal. Pupils are equal, round, and reactive to light. No scleral icterus.   Neck: Normal range of motion. Neck supple. No JVD present. No tracheal deviation present.   Cardiovascular: Normal rate, regular rhythm and normal heart sounds.  Exam reveals no gallop and no friction rub.    No murmur heard.  Pulmonary/Chest: Effort normal and breath sounds normal. No stridor. No respiratory distress. She has no wheezes. She has no rales.   Abdominal: Soft. She exhibits no distension and no mass. There is no guarding.   Musculoskeletal: She exhibits no edema or deformity.   Neurological:   Opens eyes to verbal and tactile stimulation. Does not follow commands, but is asking questions such as "Can I have some water."    Skin: Skin is warm and dry. "       Significant Labs:   Recent Results (from the past 24 hour(s))   Basic metabolic panel    Collection Time: 12/27/17  5:43 PM   Result Value Ref Range    Sodium 142 136 - 145 mmol/L    Potassium 3.6 3.5 - 5.1 mmol/L    Chloride 110 95 - 110 mmol/L    CO2 25 23 - 29 mmol/L    Glucose 116 (H) 70 - 110 mg/dL    BUN, Bld 28 (H) 8 - 23 mg/dL    Creatinine 1.1 0.5 - 1.4 mg/dL    Calcium 9.0 8.7 - 10.5 mg/dL    Anion Gap 7 (L) 8 - 16 mmol/L    eGFR if African American 52.5 (A) >60 mL/min/1.73 m^2    eGFR if non African American 45.6 (A) >60 mL/min/1.73 m^2   Basic metabolic panel    Collection Time: 12/27/17 11:50 PM   Result Value Ref Range    Sodium 142 136 - 145 mmol/L    Potassium 3.6 3.5 - 5.1 mmol/L    Chloride 109 95 - 110 mmol/L    CO2 24 23 - 29 mmol/L    Glucose 88 70 - 110 mg/dL    BUN, Bld 26 (H) 8 - 23 mg/dL    Creatinine 1.2 0.5 - 1.4 mg/dL    Calcium 8.9 8.7 - 10.5 mg/dL    Anion Gap 9 8 - 16 mmol/L    eGFR if African American 47.3 (A) >60 mL/min/1.73 m^2    eGFR if non  41.0 (A) >60 mL/min/1.73 m^2   Magnesium    Collection Time: 12/28/17  4:07 AM   Result Value Ref Range    Magnesium 2.1 1.6 - 2.6 mg/dL   Phosphorus    Collection Time: 12/28/17  4:07 AM   Result Value Ref Range    Phosphorus 1.5 (L) 2.7 - 4.5 mg/dL   CBC auto differential    Collection Time: 12/28/17  4:07 AM   Result Value Ref Range    WBC 9.16 3.90 - 12.70 K/uL    RBC 3.28 (L) 4.00 - 5.40 M/uL    Hemoglobin 10.2 (L) 12.0 - 16.0 g/dL    Hematocrit 31.7 (L) 37.0 - 48.5 %    MCV 97 82 - 98 fL    MCH 31.1 (H) 27.0 - 31.0 pg    MCHC 32.2 32.0 - 36.0 g/dL    RDW 15.5 (H) 11.5 - 14.5 %    Platelets 91 (L) 150 - 350 K/uL    MPV 11.7 9.2 - 12.9 fL    Immature Granulocytes 1.0 (H) 0.0 - 0.5 %    Gran # 6.5 1.8 - 7.7 K/uL    Immature Grans (Abs) 0.09 (H) 0.00 - 0.04 K/uL    Lymph # 1.7 1.0 - 4.8 K/uL    Mono # 0.8 0.3 - 1.0 K/uL    Eos # 0.1 0.0 - 0.5 K/uL    Baso # 0.04 0.00 - 0.20 K/uL    nRBC 0 0 /100 WBC    Gran% 70.6 38.0 -  73.0 %    Lymph% 18.4 18.0 - 48.0 %    Mono% 8.5 4.0 - 15.0 %    Eosinophil% 1.1 0.0 - 8.0 %    Basophil% 0.4 0.0 - 1.9 %    Differential Method Automated

## 2017-12-28 NOTE — PLAN OF CARE
[facility]     Department of Hospital Medicine     [address] [place] [address]     [phone] after hours  [phone] fax                                   HOSPICE  ORDERS     12/28/2017    Admit to Hospice:  Inpatient Service     Diagnoses:  Active Hospital Problems    Diagnosis  POA    *Gram-negative bacteremia [R78.81]  Yes    SIRS (systemic inflammatory response syndrome) [R65.10]  Yes    Palliative care encounter [Z51.5]  Not Applicable    Goals of care, counseling/discussion [Z71.89]  Not Applicable    Acute encephalopathy [G93.40]  Yes    Acute cystitis without hematuria [N30.00]  Yes    Acute kidney injury superimposed on chronic kidney disease [N17.9, N18.9]  Unknown    Hypernatremia [E87.0]  Unknown    Hypercalcemia [E83.52]  Unknown    Heart failure [I50.9]  Unknown    Alzheimer's dementia [G30.9]  Yes     2012: Diagnosed.      Hypertension [I10]  Yes     1990: Diagnosed.      Sick sinus syndrome [I49.5]  Yes     10/22/2012: Presented with syncope and received pacemaker.      Coronary artery disease [I25.10]  Yes     1993: EJ: CABG x 3.        Resolved Hospital Problems    Diagnosis Date Resolved POA   No resolved problems to display.       Hospice Qualifying Diagnoses: Encephalopathy 2/2 gram negative bacteremia       Patient has a life expectancy < 6 months due to these conditions.    Vital Signs: Routine per Hospice Protocol.    Allergies:  Review of patient's allergies indicates:   Allergen Reactions    Heparin analogues Shortness Of Breath    Phenytoin sodium extended Rash       Diet: Diet clear liquid, thin liquids.     Activities: As tolerated    Nursing: Per Hospice Routine    Future Orders:  Hospice Medical Director may dictate new orders for comfortable care measures & sign death certificate.    Medications:              Margarita Castrejon   Home Medication Instructions LAURI:[id number]    Printed on:12/28/17 1211    Medication Information                      albuterol-ipratropium 2.5mg-0.5mg/3mL (DUO-NEB) 0.5 mg-3 mg(2.5 mg base)/3 mL nebulizer solution  Take 3 mLs by nebulization every 4 (four) hours as needed for Wheezing or Shortness of Breath. Rescue             amLODIPine (NORVASC) 5 MG tablet  Take 1 tablet (5 mg total) by mouth once daily.             aspirin (ECOTRIN) 81 MG EC tablet  Take 1 tablet (81 mg total) by mouth once.             calcium-vitamin D tablet 600 mg-200 units  Take 1 tablet by mouth 2 (two) times daily.             cefpodoxime (VANTIN) 200 MG tablet  Take 1 tablet (200 mg total) by mouth every 12 (twelve) hours. Stop Date: 1/11/2018             donepezil (ARICEPT) 10 MG tablet  Take 1 tablet (10 mg total) by mouth once daily.             escitalopram oxalate (LEXAPRO) 10 MG tablet  Take 1 tablet (10 mg total) by mouth once daily.             furosemide (LASIX) 20 MG tablet  Take 1 tablet (20 mg total) by mouth 2 (two) times daily.             lisinopril (PRINIVIL,ZESTRIL) 5 MG tablet  Take 1 tablet (5 mg total) by mouth once daily.             memantine (NAMENDA) 10 MG Tab  Take 1 tablet (10 mg total) by mouth 2 (two) times daily.             metoprolol succinate (TOPROL-XL) 50 MG 24 hr tablet  Take 1 tablet (50 mg total) by mouth once daily.             potassium chloride SA (K-DUR,KLOR-CON) 20 MEQ tablet  Take 1 tablet (20 mEq total) by mouth once daily.             traZODone (DESYREL) 50 MG tablet  Take 1 tablet (50 mg total) by mouth every evening.                     _________________________________  Clint Moser MD  12/28/2017

## 2017-12-28 NOTE — PLAN OF CARE
Problem: Fall Risk (Adult)  Goal: Absence of Falls  Patient will demonstrate the desired outcomes by discharge/transition of care.   Outcome: Ongoing (interventions implemented as appropriate)  Reviewed POC including safety precautions to prevent falls. Pt [place] verbalized understanding.

## 2017-12-28 NOTE — PLAN OF CARE
12/28/17 1511   Final Note   Assessment Type Final Discharge Note   Discharge Disposition [facility]ospice[facility]   Right Care Referral Info   Post Acute Recommendation Other   Facility Name [facility]

## 2017-12-28 NOTE — PLAN OF CARE
The number for report at [facility] is [phone].    Transport is arranged with [facility] for 2:30PM. AVA notified the pt's nurse.     To determine type of transport SW reviewed therapy notes and spoke to pt's nurse who stated that she felt it would not be safe for the pt to travel by wheelchair van.     AVA also spoke to the pt's daughter, Maggy, who stated that the pt has gotten progressively weaker and she does not think that the pt can sit up in a wheelchair anymore. She stated that she wanted her transported by a stretcher.     Skyla De Souza, [facility]  [id number]

## 2017-12-28 NOTE — NURSING
Pt inadvertently d/c'd IV to right wrist. Cannula intact, drsg applied to site. Notified team Im2, ok to leave out until primary team sees pt. Granddaughter pressent and informed of POC.

## 2017-12-28 NOTE — PROGRESS NOTES
[facility] Hospital Medicine  Progress Note    Patient Name: Margarita Castrejon  MRN: [id number]  Patient Class: IP- Inpatient   Admission Date: 12/23/2017  Length of Stay: 5 days  Attending Physician: Jasson Lawrence MD  Primary Care Provider: Patrick Landa MD    Hospital Medicine Team: [facility] HOSP MED 2 Clint Moser MD    Subjective:     Principal Problem:Gram-negative bacteremia    HPI:  Margarita Castrejon is a 86 y.o. with Alzheimers, CAD, CVA, hypertension, hyperlipidemia, CHF and SVT who presented to the [facility] emergency department from the nursing home where she resides with altered mental status.  Patient is currently non-verbal and the history was obtained from her daughter who brought her to the ED.  Her daughter reports that at her baseline she is able to stand with a walker/ aids, assist with dressing by aids, and is normally able to communicate verbally.  She also reports that she was hospitalized at [facility] for urosepsis.      Hospital Course:  Margarita Castrejon was admitted to internal medicine on 12/23/2017.  Patient was found to be hypernatremic and she was started on D5.  She was continued on Rocephin for presumed UTI.  Blood cx growing gram Neg rods. Na normalized at 144 and D5W was discontinued. However, Na trending up and pt NPO , so started on D5/.45NS 75cc/hr Palliative consulted for assistance with family goals of care discussion.     Interval History: Patient's PIV fell out last night and may need to be replaced depending on course of action going forward. Per family, patient is a lot more interactive this morning: holding brief conversation, talking on the phone, following commands, and repeating back colors appropriately.     Review of Systems   Unable to perform ROS: Patient nonverbal     Objective:     Vital Signs (Most Recent):  Temp: 97.6 °F (36.4 °C) (12/28/17 0725)  Pulse: 62 (12/28/17 0748)  Resp: 18 (12/28/17 0725)  BP: (!) 100/51 (12/28/17 0725)  SpO2: 98 %  "(12/28/17 8122) Vital Signs (24h Range):  Temp:  [97.6 °F (36.4 °C)-98.5 °F (36.9 °C)] 97.6 °F (36.4 °C)  Pulse:  [60-81] 62  Resp:  [17-20] 18  SpO2:  [94 %-98 %] 98 %  BP: ()/(51-70) 100/51        There is no height or weight on file to calculate BMI.    Intake/Output Summary (Last 24 hours) at 12/28/17 0826  Last data filed at 12/27/17 1830   Gross per 24 hour   Intake               60 ml   Output                0 ml   Net               60 ml      Physical Exam   Constitutional:   86 year old female appearing stated age, does not appear to be in acute distress   HENT:   Head: Normocephalic and atraumatic.   Eyes: EOM are normal. Pupils are equal, round, and reactive to light. No scleral icterus.   Neck: Normal range of motion. Neck supple. No JVD present. No tracheal deviation present.   Cardiovascular: Normal rate, regular rhythm and normal heart sounds.  Exam reveals no gallop and no friction rub.    No murmur heard.  Pulmonary/Chest: Effort normal and breath sounds normal. No stridor. No respiratory distress. She has no wheezes. She has no rales.   Abdominal: Soft. She exhibits no distension and no mass. There is no guarding.   Musculoskeletal: She exhibits no edema or deformity.   Neurological:   Opens eyes to verbal and tactile stimulation. Does not follow commands, but is asking questions such as "Can I have some water."    Skin: Skin is warm and dry.       Significant Labs:   Recent Results (from the past 24 hour(s))   Basic metabolic panel    Collection Time: 12/27/17  5:43 PM   Result Value Ref Range    Sodium 142 136 - 145 mmol/L    Potassium 3.6 3.5 - 5.1 mmol/L    Chloride 110 95 - 110 mmol/L    CO2 25 23 - 29 mmol/L    Glucose 116 (H) 70 - 110 mg/dL    BUN, Bld 28 (H) 8 - 23 mg/dL    Creatinine 1.1 0.5 - 1.4 mg/dL    Calcium 9.0 8.7 - 10.5 mg/dL    Anion Gap 7 (L) 8 - 16 mmol/L    eGFR if African American 52.5 (A) >60 mL/min/1.73 m^2    eGFR if non African American 45.6 (A) >60 mL/min/1.73 m^2 "   Basic metabolic panel    Collection Time: 12/27/17 11:50 PM   Result Value Ref Range    Sodium 142 136 - 145 mmol/L    Potassium 3.6 3.5 - 5.1 mmol/L    Chloride 109 95 - 110 mmol/L    CO2 24 23 - 29 mmol/L    Glucose 88 70 - 110 mg/dL    BUN, Bld 26 (H) 8 - 23 mg/dL    Creatinine 1.2 0.5 - 1.4 mg/dL    Calcium 8.9 8.7 - 10.5 mg/dL    Anion Gap 9 8 - 16 mmol/L    eGFR if African American 47.3 (A) >60 mL/min/1.73 m^2    eGFR if non  41.0 (A) >60 mL/min/1.73 m^2   Magnesium    Collection Time: 12/28/17  4:07 AM   Result Value Ref Range    Magnesium 2.1 1.6 - 2.6 mg/dL   Phosphorus    Collection Time: 12/28/17  4:07 AM   Result Value Ref Range    Phosphorus 1.5 (L) 2.7 - 4.5 mg/dL   CBC auto differential    Collection Time: 12/28/17  4:07 AM   Result Value Ref Range    WBC 9.16 3.90 - 12.70 K/uL    RBC 3.28 (L) 4.00 - 5.40 M/uL    Hemoglobin 10.2 (L) 12.0 - 16.0 g/dL    Hematocrit 31.7 (L) 37.0 - 48.5 %    MCV 97 82 - 98 fL    MCH 31.1 (H) 27.0 - 31.0 pg    MCHC 32.2 32.0 - 36.0 g/dL    RDW 15.5 (H) 11.5 - 14.5 %    Platelets 91 (L) 150 - 350 K/uL    MPV 11.7 9.2 - 12.9 fL    Immature Granulocytes 1.0 (H) 0.0 - 0.5 %    Gran # 6.5 1.8 - 7.7 K/uL    Immature Grans (Abs) 0.09 (H) 0.00 - 0.04 K/uL    Lymph # 1.7 1.0 - 4.8 K/uL    Mono # 0.8 0.3 - 1.0 K/uL    Eos # 0.1 0.0 - 0.5 K/uL    Baso # 0.04 0.00 - 0.20 K/uL    nRBC 0 0 /100 WBC    Gran% 70.6 38.0 - 73.0 %    Lymph% 18.4 18.0 - 48.0 %    Mono% 8.5 4.0 - 15.0 %    Eosinophil% 1.1 0.0 - 8.0 %    Basophil% 0.4 0.0 - 1.9 %    Differential Method Automated          Assessment/Plan:      * Gram-negative bacteremia    -suspect 2/2 UTI  -UCx- positive for Klebsiella and Providenicia sensitive to Rocephin and Bactrim  -BCx positive for E. Coli that is pansensitive  -blood cx 12/24-NGTD  -Switched antibiotic course to PO Cefpodoxime 200 BID for 2 week course          Goals of care, counseling/discussion              Palliative care encounter               Acute encephalopathy    Patient reported to be interactive and verbal at baseline.  Over last week has become non-verbal.    -Etiology likely infection (UTI) and gram negative bacteremia  -Hypernatremia and hypercalcemia also probably contributing   -WIll continue to treat UTI and hypernatremia to assess for improvement  -Though unlikely condition could represent progression of dementia  -palliative consulted for assistance with goals of care discussion          Heart failure    Patients family reports recent diagnosis of heart failure.  No recent echocardiogram in records  -Will continue to hold lasix and ACEi in setting of LUCIEN  -Resume beta blocker when able           Hypercalcemia    Calcium 11.5 that corrects to 13.1 on admission; improving  -IVF  -regular BMP  -Will hold lasix for now given LUCIEN          SIRS (systemic inflammatory response syndrome)    Patient does not meet criteria for sepsis at this time.  SIRS 1/4 for elevated WBC count with likely source of infection being from the urinary tract.  --as under UTI          Hypernatremia    Sodium 152 upon admission.  Likely etiologies include impaired access to free water given her mental status and the continuation of lasix.  -Rehydration with D5 and 1/2 NS@75cc/hr with little improvement  -BMP q 8 to assess for the rate of correction  -Na 145 this AM.   -continue to monitor          Acute kidney injury superimposed on chronic kidney disease    Patient with history of chronic kidney disease with a baseline ~1.1.  Per nursing home records and patients daughter she has had poor PO intake especially over the last week.  UA consistent with dehydration with 16 hyaline cast.  Another contributing factor could be her urinary tract infection.  -improving with IVFs  -avoid nephrotoxic agents          Acute cystitis without hematuria    Urinalysis positive with 78 WBC and many bacteria.  -Urine culture- Klebsiella and Providencia sensitive to rocephin and  bactrm  -Blood cx- E coli that is pan sensitive  -Blood cx 12/24: NGTD  -Switched antibiotic course to PO Cefpodoxime 200 BID for 2 week course.           Alzheimer's dementia    Normal home medications including donepezil and memantine   -Per ST pt to remain NPO  -Resume medications when patient safely tolerating oral intake  - will discuss NG tube placement with family, family currently stating patient would not want an NG tube.   -Patient is able to swallow, however continues to require encouragement and interaction for assistance given poor mentation.         Hypercholesteremia              Coronary artery disease    Holding  aspirin 81mg currently given difficulty swallowing          Hypertension    Continue amlodipine when able to take PO intake  Currently BP at goal        Sick sinus syndrome    S/p pace maker placement            VTE Risk Mitigation         Ordered     High Risk of VTE  Once      12/23/17 2058     Place sequential compression device  Until discontinued      12/23/17 1657        Dispo: Patient being interviewed by inpatient hospice to determine if proper candidate, will consider NH with hospice otherwise.     Clint Moser MD  Department of Hospital Medicine   [facility]

## 2017-12-28 NOTE — PT/OT/SLP PROGRESS
"Speech Language Pathology Treatment    Patient Name:  Margarita Castrejon   MRN:  [id number]  Admitting Diagnosis: Gram-negative bacteremia    Recommendations:                 General Recommendations:  Dysphagia therapy  Diet recommendations:  Liquid Diet Level: Clears, Thin   Aspiration Precautions:  pt must be awake/alert to accept PO intake, positioned as upright as possible, given max cues to initiate swallow and ensure clearance of oral cavity as needed, and closely monitored for overt s/s of aspiration. Cease PO intake if pt not responsive to cues to clear oral cavity and/or swallow or if overt s/s of aspiration are present.  If oral meds are necessary, the safest method for administration if crushed and buried in puree with MAX cues to ensure swallow is initiated and oral cavity is cleared.  General Precautions: Standard, aspiration, fall  Communication strategies:  minimal communication    Subjective     "She discharging to hospice at the [facility]." pt's daughter stated upon SLP's entry.    Pain/Comfort:  · Pain Rating 1:  (no indications of pain)    Objective:     Has the patient been evaluated by SLP for swallowing?   Yes  Keep patient NPO? No   Current Respiratory Status:        Pt asleep and laying in bed in fetal position upon entry.  Pt's daughter and 2 granddaughters were present.  Daughter stated plan for pt to discharge to a hospice facility today.  SLP noted diet orders were placed for a clear liquid diet.  Family stated PO intake has been minimal with inconsistent tolerance and need for max cues to swallow and remain awake/alert to accept PO.  Pt was able to respond to verbal and tactile stimuli to rouse. SLP was able to raise HOB and reposition pt in more upright position to increase safety for PO trials.  Pt was accepting of PO presentations of apple juice via small straw, plain pudding (1/2 tsp boluses), and crushed meds buried in pudding.  Oral transit and swallow responses were timely for " (T74.523) Keratoconjunct sicca, not specified as Sjogren's, bilateral - Assesment : Examination revealed Dry Eye Syndrome - Plan : Monitor for changes. Advised patient to call our office with decreased vision or increased symptoms. Use artificial tears as needed. juice and plain pudding trials.  Pt with delay in initiation of oral transit and swallow for crushed medications, but pt was able to clear from oral cavity adequately. Burping present after intake, but no overt s/s of aspiration were observed.  SLP discussed with nurse and family benefits of administering medications crushed and buried in puree at this time.  Understanding and agreement were expressed.    Assessment:     Margarita Castrejon is a 86 y.o. female with an SLP diagnosis of Dysphagia.      Goals:    SLP Goals        Problem: SLP Goal    Goal Priority Disciplines Outcome   SLP Goal     SLP Ongoing (interventions implemented as appropriate)   Description:  Speech Language Pathology Goals  Goals expected to be met by 12/31  1. Pt will participate in ongoing swallow assessment to determine least restrictive diet.                        Plan:     · Patient to be seen:  5 x/week   · Plan of Care expires:  01/22/18  · Plan of Care reviewed with:  patient, family   · SLP Follow-Up:  Yes       Discharge recommendations:  hospice facility        Time Tracking:     SLP Treatment Date:   12/28/17  Speech Start Time:  1140  Speech Stop Time:  1151     Speech Total Time (min):  11 min    Billable Minutes: Treatment Swallowing Dysfunction 11    ELIECER Santiago, CCC-SLP  12/28/2017     ELIECER Santiago, CCC-SLP  Speech Language Pathologist  [phone]  12/28/2017

## 2017-12-28 NOTE — DISCHARGE SUMMARY
[facility] Hospital Medicine  Discharge Summary      Patient Name: Margarita Castrejon  MRN: [id number]  Admission Date: 12/23/2017  Hospital Length of Stay: 5 days  Discharge Date and Time: 12/28/2017  3:11 PM  Attending Physician: Sherice Roth MD   Discharging Provider: Clint Moser MD  Primary Care Provider: Patrick Landa MD  Hospital Medicine Team: [facility] HOSP MED 2 Clint Moser MD    HPI:   Margarita Castrejon is a 86 y.o. with Alzheimers, CAD, CVA, hypertension, hyperlipidemia, CHF and SVT who presented to the [facility] emergency department from the nursing home where she resides with altered mental status.  Patient is currently non-verbal and the history was obtained from her daughter who brought her to the ED.  Her daughter reports that at her baseline she is able to stand with a walker/ aids, assist with dressing by aids, and is normally able to communicate verbally.  She also reports that she was hospitalized at [facility] for urosepsis.      * No surgery found *      Hospital Course:   Margarita Castrejon was admitted to internal medicine on 12/23/2017.  Patient was found to be hypernatremic and she was started on D5.  She was continued on Rocephin for presumed UTI.  Blood cx growing gram Neg rods. Na normalized at 144 and D5W was discontinued. However, Na trending up and pt NPO , so started on D5/.45NS 75cc/hr Palliative consulted for assistance with family goals of care discussion. Encephalopathy did not resolve after antibiotic course and resolution of hypernatremia. Decision made with family to pursue hospice care. Patient accepted to inpatient hospice.      Consults:   Consults         Status Ordering Provider     Inpatient consult to Palliative Care  Once     Provider:  (Not yet assigned)    Completed CRISTOFER ALMAZAN     Inpatient consult to PICC team (NIAS)  Once     Provider:  (Not yet assigned)    Completed SHERICE ROTH          Gram-negative bacteremia     -suspect 2/2  UTI  -UCx- positive for Klebsiella and Providenicia sensitive to Rocephin and Bactrim  -BCx positive for E. Coli that is pansensitive  -blood cx 12/24-NGTD  -Switched antibiotic course to PO Cefpodoxime 200 BID for 2 week course          Goals of care, counseling/discussion                Palliative care encounter                Acute encephalopathy     Patient reported to be interactive and verbal at baseline.  Over last week has become non-verbal.    -Etiology likely infection (UTI) and gram negative bacteremia  -Hypernatremia and hypercalcemia also probably contributing   -WIll continue to treat UTI and hypernatremia to assess for improvement  -Though unlikely condition could represent progression of dementia  -palliative consulted for assistance with goals of care discussion          Heart failure     Patients family reports recent diagnosis of heart failure.  No recent echocardiogram in records  -Will continue to hold lasix and ACEi in setting of LUCIEN  -Resume beta blocker when able           Hypercalcemia     Calcium 11.5 that corrects to 13.1 on admission; improving  -IVF  -regular BMP  -Will hold lasix for now given LUCIEN          SIRS (systemic inflammatory response syndrome)     Patient does not meet criteria for sepsis at this time.  SIRS 1/4 for elevated WBC count with likely source of infection being from the urinary tract.  --as under UTI          Hypernatremia     Sodium 152 upon admission.  Likely etiologies include impaired access to free water given her mental status and the continuation of lasix.  -Rehydration with D5 and 1/2 NS@75cc/hr with little improvement  -BMP q 8 to assess for the rate of correction  -Na 145 this AM.   -continue to monitor          Acute kidney injury superimposed on chronic kidney disease     Patient with history of chronic kidney disease with a baseline ~1.1.  Per nursing home records and patients daughter she has had poor PO intake especially over the last week.  UA  consistent with dehydration with 16 hyaline cast.  Another contributing factor could be her urinary tract infection.  -improving with IVFs  -avoid nephrotoxic agents          Acute cystitis without hematuria     Urinalysis positive with 78 WBC and many bacteria.  -Urine culture- Klebsiella and Providencia sensitive to rocephin and bactrm  -Blood cx- E coli that is pan sensitive  -Blood cx 12/24: NGTD  -Switched antibiotic course to PO Cefpodoxime 200 BID for 2 week course.           Alzheimer's dementia     Normal home medications including donepezil and memantine   -Per ST pt to remain NPO  -Resume medications when patient safely tolerating oral intake  - will discuss NG tube placement with family, family currently stating patient would not want an NG tube.   -Patient is able to swallow, however continues to require encouragement and interaction for assistance given poor mentation.        Hypercholesteremia                Coronary artery disease     Holding  aspirin 81mg currently given difficulty swallowing          Hypertension     Continue amlodipine when able to take PO intake  Currently BP at goal       Sick sinus syndrome     S/p pace maker placement       Final Active Diagnoses:    Diagnosis Date Noted POA    PRINCIPAL PROBLEM:  Gram-negative bacteremia [R78.81] 12/25/2017 Yes    Palliative care encounter [Z51.5] 12/26/2017 Not Applicable    Goals of care, counseling/discussion [Z71.89]  Not Applicable    Acute encephalopathy [G93.40] 12/24/2017 Yes    Acute cystitis without hematuria [N30.00] 12/23/2017 Yes    Acute kidney injury superimposed on chronic kidney disease [N17.9, N18.9] 12/23/2017 Unknown    Heart failure [I50.9] 12/23/2017 Unknown    Alzheimer's dementia [G30.9] 06/24/2014 Yes    Hypertension [I10] 06/28/2013 Yes    Sick sinus syndrome [I49.5] 06/28/2013 Yes    Coronary artery disease [I25.10] 06/28/2013 Yes      Problems Resolved During this Admission:    Diagnosis Date Noted Date  Resolved POA    SIRS (systemic inflammatory response syndrome) [R65.10] 12/27/2017 12/28/2017 Yes    Hypernatremia [E87.0] 12/23/2017 12/28/2017 Unknown    Hypercalcemia [E83.52] 12/23/2017 12/28/2017 Unknown       Discharged Condition: stable    Disposition: Hospice/Medical Facility    Follow Up:    Patient Instructions:     Diet clear liquid   Order Comments: Thin liquids.     Activity as tolerated             Pending Diagnostic Studies:     None         Medications:  Reconciled Home Medications:   Discharge Medication List as of 12/28/2017  1:43 PM      CONTINUE these medications which have CHANGED    Details   amLODIPine (NORVASC) 5 MG tablet Take 1 tablet (5 mg total) by mouth once daily., Starting Thu 12/28/2017, Until Fri 12/28/2018, Normal      aspirin (ECOTRIN) 81 MG EC tablet Take 1 tablet (81 mg total) by mouth once., Starting Thu 12/28/2017, OTC      calcium-vitamin D tablet 600 mg-200 units Take 1 tablet by mouth 2 (two) times daily., Starting Thu 12/28/2017, Until Fri 12/28/2018, OTC      cefpodoxime (VANTIN) 200 MG tablet Take 1 tablet (200 mg total) by mouth every 12 (twelve) hours., Starting Thu 12/28/2017, Until Thu 1/11/2018, Normal      donepezil (ARICEPT) 10 MG tablet Take 1 tablet (10 mg total) by mouth once daily., Starting Thu 12/28/2017, Until Fri 12/28/2018, Normal      escitalopram oxalate (LEXAPRO) 10 MG tablet Take 1 tablet (10 mg total) by mouth once daily., Starting Thu 12/28/2017, Until Fri 12/28/2018, Normal      furosemide (LASIX) 20 MG tablet Take 1 tablet (20 mg total) by mouth 2 (two) times daily., Starting Thu 12/28/2017, Until Fri 12/28/2018, Normal      lisinopril (PRINIVIL,ZESTRIL) 5 MG tablet Take 1 tablet (5 mg total) by mouth once daily., Starting Thu 12/28/2017, Until Fri 12/28/2018, Normal      memantine (NAMENDA) 10 MG Tab Take 1 tablet (10 mg total) by mouth 2 (two) times daily., Starting Thu 12/28/2017, Until Fri 12/28/2018, Normal      metoprolol succinate  (TOPROL-XL) 50 MG 24 hr tablet Take 1 tablet (50 mg total) by mouth once daily., Starting Thu 12/28/2017, Until Fri 12/28/2018, Normal      potassium chloride SA (K-DUR,KLOR-CON) 20 MEQ tablet Take 1 tablet (20 mEq total) by mouth once daily., Starting Thu 12/28/2017, Normal      traZODone (DESYREL) 50 MG tablet Take 1 tablet (50 mg total) by mouth every evening., Starting Thu 12/28/2017, Until Fri 12/28/2018, Normal         CONTINUE these medications which have NOT CHANGED    Details   albuterol-ipratropium 2.5mg-0.5mg/3mL (DUO-NEB) 0.5 mg-3 mg(2.5 mg base)/3 mL nebulizer solution Take 3 mLs by nebulization every 4 (four) hours as needed for Wheezing or Shortness of Breath. Rescue, Historical Med         STOP taking these medications       alendronate (FOSAMAX) 70 MG tablet Comments:   Reason for Stopping:         guaifenesin (MUCINEX) 600 mg 12 hr tablet Comments:   Reason for Stopping:               Indwelling Lines/Drains at time of discharge:   Lines/Drains/Airways          No matching active lines, drains, or airways          Time spent on the discharge of patient: 25 minutes  Patient was seen and examined on the date of discharge and determined to be suitable for discharge.         Clint Moser MD  Department of Hospital Medicine  [facility]

## 2017-12-29 LAB
BACTERIA BLD CULT: NORMAL
BACTERIA BLD CULT: NORMAL

## 2017-12-31 NOTE — PT/OT/SLP DISCHARGE
Occupational Therapy Discharge Summary    Margarita Castrejon  MRN: [id number]   Principal Problem: Gram-negative bacteremia      Patient Discharged from acute Occupational Therapy on 12/28/17.  Please refer to prior OT note 12/27/17dated for functional status.    Assessment:      Patient appropriate for care in another setting.    Objective:     GOALS:    Occupational Therapy Goals        Problem: Occupational Therapy Goal    Goal Priority Disciplines Outcome Interventions   Occupational Therapy Goal     OT, PT/OT Ongoing (interventions implemented as appropriate)    Description:  Goals to be met by: 1/3/17    Patient will increase functional independence with ADLs by performing:    UE Dressing with Moderate Assistance.  Grooming while seated with Moderate Assistance.  Supine to sit with Maximum Assistance.  Stand pivot transfer with max Assistance.  Upper extremity exercise program per handout, with CG independence.  CG demo understanding of positioning while supine in bed.                       Reasons for Discontinuation of Therapy Services  Transfer to alternate level of care.      Plan:     Patient Discharged to: Palliative Care/Hospice    Paula carney OT  12/31/2017

## 2018-01-08 NOTE — PHYSICIAN QUERY
PT Name: Margarita Castrejon  MR #: [id number]  Physician Query Form - CKD Clarification     CDS/: Tayla Johnston RN           Contact information: [phone]  This form is a permanent document in the medical record.     Query Date: January 8, 2018    By submitting this query, we are merely seeking further clarification of documentation. Please utilize your independent clinical judgment when addressing the question(s) below.    The Medical record contains the following:     Indicators   Supporting Clinical Findings   Location in Medical Record   x CKD or Chronic Kidney (Renal) Failure / Disease  Patient with history of chronic kidney disease with a baseline ~1.1.       H&P 12/24   x BUN/Creatinine                          GFR GFR 37.2-41.0    Cr 1.1-1.3 Labs 12/27    Dehydration      Nausea / Vomiting      Dialysis / CRRT      Medication      Treatment      Other Chronic Conditions      Other       Provider, please further specify the stage of CKD.      [ x ] Chronic Kidney Disease (CKD) (please specify stage* below)       [employer] Definitions  Stage Description  eGFR (mL/min)   [  ]     I Slight kidney damage with normal or increased filtration 90+   [  ]     II Mildly reduced kidney function 60-89   [ x ]     III Moderately reduced kidney function 30-59   [  ]     IV Severely reduced kidney function 15-29   [  ]     V Kidney failure, requiring transplant or dialysis <15     [  ] CKD on Chronic Hemodialysis (please specify stage*): __________  [  ] End Stage Renal Disease (ESRD)  [  ] Other (please specify): ________________________  [  ] Clinically Undetermined    Please document in your progress notes daily for the duration of treatment until resolved and include in your discharge summary.

## 2018-01-11 NOTE — PHYSICIAN QUERY
PT Name: Margarita Castrejon  MR #: [id number]     Physician Query Form - Diagnosis Clarification      CDS/: Cornelia Lu RN  CCDS               Contact information: [email or web address]  This form is a permanent document in the medical record.     Query Date: January 11, 2018    By submitting this query, we are merely seeking further clarification of documentation.  Please utilize your independent clinical judgment when addressing the question(s) below.     The medical record contains the following:      Findings Supporting Clinical Information Location in Medical Record   sepsis 86 y.o. with Alzheimers, CAD, CVA, hypertension, hyperlipidemia, CHF and SVT who presented to the [facility] emergency department from the nursing home where she resides with altered mental status  Agree with management of severe sepsis with acute organ dysfunction (renal) due to UTI with now GN bacteremia   Will hold lasix and ACEi in setting of LUCIEN   Temp: 98.9 °F   Pulse: 63   Resp: 20   BP: 132/64   SpO2: 98 %    WBC = 13.11    Agree with management of severe sepsis with acute organ dysfunction (renal) due to UTI with now E. coli bacteremia     Gram-negative bacteremia - suspect 2/2 UTI   -UCx- positive for Klebsiella and Providenicia sensitive to Rocephin and Bactrim   -BCx positive for E. Coli that is pansensitive   SIRS (systemic inflammatory response syndrome) - Patient does not meet criteria for sepsis at this time. SIRS 1/4 for elevated WBC count with likely source of infection being from the urinary tract.  H&P                      Labs 12/23      Hosp Med progress note 12/26      Discharge Summary     Please clarify if the sepsis diagnosis has been:    [ x ] Ruled In  [  ] Ruled In, Now Resolved  [  ] Ruled Out  [  ] Clinically undetermined  [  ] Other/Clarification of findings (please specify)_____________________________    Please document in your progress notes daily for the duration of treatment, until resolved, and  include in your discharge summary.

## 2022-03-16 NOTE — ASSESSMENT & PLAN NOTE
- BP well controlled  - Resume home norvasc, lisinopril, and metoprolol  
- BP well controlled  - Resume home norvasc, lisinopril, and metoprolol  
- RLQ abdominal pain, currently resolved  - CT abd/pelvis with stable appearing large hiatal hernia otherwise no acute findings  - General surgery consulted in ED and no surgical cause for pain identified. Repair of hernia not warranted  - Advance to dental soft diet and consult SLP given reported dysphagia by daughter  - Monitor overnight for worsening pain or reflux  - aspiration precautions  
- RLQ abdominal pain, resolved since admission  - CT abd/pelvis with stable appearing large hiatal hernia otherwise no acute findings  - General surgery consulted in ED and no surgical cause for pain identified. Repair of hernia not warranted  - SLP consulted given reported dysphagia by daughter, recommend patient continue on pureed foods and nectar thick liquids  - No worsening pain or reflux overnight. Stable for discharge back to NH today. Recommend outpatient PCP follow up.   - Continue aspiration precautions  
- SCr 1.1, improved to 0,8 with repeat. Near baseline.  
- SCr 1.1, near baseline  
- clinically euvolemic/slightly dry  - Hold lasix overnight  - given IVFs in ED  - Monitor I/Os  
- clinically euvolemic/slightly dry  - Hold lasix overnight, ok to resume on discharge  - given IVFs in ED  - Monitor I/Os  
- continue home namenda and aricept  - Delirium precautions  
- continue home namenda and aricept  - Delirium precautions  
- no chest pain on exam  - Continue ASA and statin  
- no chest pain on exam  - Continue ASA and statin  
- see plan above  
- see plan above  
Patient with chronic hiatal hernia for at least the last 6 years  Stable in size over this time course  No significant symptoms of hiatal hernia  Multiple medical co morbidities would make repair of this hernia in an asymptomatic patient a very high risk operation  Repair is not warranted at this time  Her abdominal pain was located more in the pelvis than in the epigastrium or chest  She is tolerating her diet and having normal bowel function  CT scan with no other significant finding to explain this pelvic pain  Her pain is improved at this point, and only occurring with deep palpation  No surgical cause of pain identified at this time  No need for urgent surgical intervention  Would continue to monitor for worsening of pain, dysphagia or reflux symptoms    
Admission

## 2022-03-24 NOTE — PLAN OF CARE
Pt was made aware of the above  Orders in the system  Pt will go next week  Transportation has been arranged w/[facility]  ([place]) ph#[phone] for return to [facility]. shea Winter, ph# [phone] has been notified of n/h return and she is agreeable.  Chelly, nurse [id number], has been given # [phone] and hand off to [facility].  Pt will return to room 5.    Pt is scheduled to be picked up for 3:00pm.    Shelley Sunshine LMSW  [id number]

## 2022-04-27 NOTE — PLAN OF CARE
"Palliative Care:    Per Speech Pathologist ELIECER Ruth, CCC-SLP "Pt more able to readily accept and manage PO trials of thin liquids and puree.  Concerns for aspiration and ability to maintain nutrition/hydration orally remain due to decreased cognition.  Spoke to team and family regarding consideration pleasure feedings vs PO diet with risk of aspiration and decreased ability to maintain nutrition."     SW made visit to pt's room upon family request. Family's question is "Does patient meet criteria for inpatient hospice as she has become a little more alert and able to "swallow"? Pt's daughters Klaudia and Maggy at bedside. Dtr Megan was on phone. Two of pt's grandchildren and 1 great grand son present.     SW explained differences of inpatient vs nursing home vs home hospice. Family stated that home hospice is not an option at this time. Dtr Maggy stated that she did not want pt to return to [facility] and felt that pt received bad care there.      SW explained that pt was on the verge of not really meeting criteria for an inpatient hospice, especially if she is "able to swallow". (Per Speech note, pt at risk due to decreased cognition and aspiration risk).  SW explained that inpatient hospices are usually for patients that have 2 weeks to live or someone with symptom management issues. SW explained that if pt does get admitted to an inpatient hospice and the pt stabilizes then the hospice will assist with setting up nursing home placement with hospice services.     SW provided list of inpatient hospices and brochures. Dtr Klaudia verbalized wanting to visit facilities and that they had a friend who had family at [facility].     SW explained that pt could be evaluated by the hospice to determine if pt could be placed in their inpatient. Family interested in this option and verbalized understanding.       Dr. Lawrence and [facility] entered room. Dr. Lawrence " verbalized being unsure if pt met criteria for inpatient hospice care. SW explained this SW's rational behind the evaluation by the inpatient hospice especially if pt is not eating.  Family verbalized to Dr. Lawrence that they were not satisfied with the care at the nursing home and was interested in new placement if pt wasn't able to go to an inpatient hospice.     SW will follow up and be available to provide resources and information as appropriate.  Family to visit inpatient units and will inform team of their choice for evaluation for inpatient.       Candice Lopez, RENETTA, ACHP-SW               Procedure To Be Performed At Next Visit: Biopsy by shave method Introduction Text (Please End With A Colon): The following procedure was deferred: cryosurgery Detail Level: Detailed